# Patient Record
Sex: FEMALE | Race: WHITE | NOT HISPANIC OR LATINO | Employment: OTHER | URBAN - METROPOLITAN AREA
[De-identification: names, ages, dates, MRNs, and addresses within clinical notes are randomized per-mention and may not be internally consistent; named-entity substitution may affect disease eponyms.]

---

## 2017-10-03 ENCOUNTER — GENERIC CONVERSION - ENCOUNTER (OUTPATIENT)
Dept: OTHER | Facility: OTHER | Age: 62
End: 2017-10-03

## 2018-01-12 NOTE — RESULT NOTES
Message   Recorded as Task   Date: 08/22/2016 12:41 PM, Created By: Jeff Kent   Task Name: Follow Up   Assigned To: Mario James   Regarding Patient: Reymundo Fernandes, Status: In Progress   Comment:    Chela Suarez - 22 Aug 2016 12:41 PM     TASK CREATED  PT CALLED AND SAID THAT YOU NO LONGER NEED TO LOOK AT HER FILES  HER # [id number]  THANKS! Ari Haro - 23 Aug 2016 3:04 PM     TASK EDITED                 I have reviewed her files and will check in with her anyway  Mekhi Joshi - 24 Aug 2016 8:18 AM     TASK IN PROGRESS        Signatures   Electronically signed by :  Faiza Hitchcock, ; Aug 24 2016  8:30AM EST                       (Author)

## 2018-01-13 NOTE — MISCELLANEOUS
Message   Recorded as Task   Date: 08/22/2016 12:41 PM, Created By: Wilmar Mcginnis   Task Name: Follow Up   Assigned To: Zonia Fischer   Regarding Patient: Katie Newman, Status: In Progress   Comment:    Chela Suarez - 22 Aug 2016 12:41 PM     TASK CREATED  PT CALLED AND SAID THAT YOU NO LONGER NEED TO LOOK AT HER FILES  HER # [id number]  THANKS! Ari Haro - 23 Aug 2016 3:04 PM     TASK EDITED                 I have reviewed her files and will check in with her anyway  Zeynepfareed Bartlett - 24 Aug 2016 8:18 AM     TASK IN PROGRESS        Active Problems    1  Contusion of foot, left (924 20) (S90 32XA)   2  Encounter for long-term (current) use of medications (V58 69) (Z79 899)   3  Encounter for routine gynecological examination (V72 31) (Z01 419)   4  Encounter for routine gynecological examination (V72 31) (Z01 419)   5  Encounter for screening colonoscopy (V76 51) (Z12 11)   6  Encounter for screening mammogram for malignant neoplasm of breast (V76 12)   (Z12 31)   7  History of colon polyps (V12 72) (Z86 010)   8  Left foot pain (729 5) (M79 672)   9  Postmenopausal (V49 81) (Z78 0)   10  Post-menopausal bleeding (627 1) (N95 0)   11  Screening for HPV (human papillomavirus) (V73 81) (Z11 51)    Current Meds   1  Hydrochlorothiazide CAPS; Therapy: (Recorded:[id number]) to Recorded    Allergies    1  Penicillins  Denied    2  Hydrochlorothiazide CAPS    Signatures   Electronically signed by : Noe Nicholas LPN;  Aug 24 2016  8:31AM EST                       (Author)

## 2018-08-08 ENCOUNTER — TRANSCRIBE ORDERS (OUTPATIENT)
Dept: ADMINISTRATIVE | Facility: HOSPITAL | Age: 63
End: 2018-08-08

## 2018-08-08 DIAGNOSIS — Z86.39 PERSONAL HISTORY OF HYPERTHYROIDISM: Primary | ICD-10-CM

## 2018-08-08 DIAGNOSIS — E04.1 NONTOXIC UNINODULAR GOITER: ICD-10-CM

## 2018-08-16 ENCOUNTER — HOSPITAL ENCOUNTER (OUTPATIENT)
Dept: RADIOLOGY | Facility: MEDICAL CENTER | Age: 63
Discharge: HOME | End: 2018-08-16
Payer: COMMERCIAL

## 2018-08-16 DIAGNOSIS — Z86.39 PERSONAL HISTORY OF HYPERTHYROIDISM: ICD-10-CM

## 2018-08-16 PROCEDURE — 76536 US EXAM OF HEAD AND NECK: CPT

## 2018-08-22 ENCOUNTER — OFFICE VISIT (OUTPATIENT)
Dept: OBGYN CLINIC | Facility: CLINIC | Age: 63
End: 2018-08-22
Payer: COMMERCIAL

## 2018-08-22 VITALS — SYSTOLIC BLOOD PRESSURE: 108 MMHG | WEIGHT: 148 LBS | DIASTOLIC BLOOD PRESSURE: 58 MMHG | BODY MASS INDEX: 27.07 KG/M2

## 2018-08-22 DIAGNOSIS — N89.8 VAGINAL ODOR: Primary | ICD-10-CM

## 2018-08-22 PROCEDURE — 99213 OFFICE O/P EST LOW 20 MIN: CPT | Performed by: PHYSICIAN ASSISTANT

## 2018-08-22 RX ORDER — TINIDAZOLE 500 MG/1
TABLET ORAL
Qty: 10 TABLET | Refills: 0 | Status: SHIPPED | OUTPATIENT
Start: 2018-08-22 | End: 2018-08-27

## 2018-08-22 RX ORDER — TRIAMTERENE AND HYDROCHLOROTHIAZIDE 37.5; 25 MG/1; MG/1
CAPSULE ORAL
COMMUNITY
Start: 2018-06-09

## 2018-08-22 NOTE — PROGRESS NOTES
Lindie Kocher  1955    S:  61 y o  female here for a problem visit  She reports a nine day history of vulvar burning  She recently did switch to an antibacterial soap  She had one day of some yellow discharge but nothing since  She uses bioidentical hormones  She has not been sexually active in years due to her vaginal atrophy  She says that the bioidentical hormones have improved her vaginal atrophy  Past Medical History:   Diagnosis Date    Graves disease     Hyperthyroidism     Skin cancer      Family History   Problem Relation Age of Onset    Cancer Mother     Alzheimer's disease Father     Hypertension Father     Skin cancer Father     Skin cancer Brother     Cancer Paternal Grandmother     Skin cancer Paternal Aunt      Social History     Social History    Marital status: /Civil Union     Spouse name: N/A    Number of children: N/A    Years of education: N/A     Social History Main Topics    Smoking status: Never Smoker    Smokeless tobacco: Never Used    Alcohol use No    Drug use: No    Sexual activity: No     Other Topics Concern    None     Social History Narrative    None       O:  /58   Wt 67 1 kg (148 lb)   BMI 27 07 kg/m²   She appears well and is in no distress  Abdomen is soft and nontender  External genitals are normal without lesions or rashes  Vagina is severely atrophic, introitus is narrow, no discharge seen, fishy odor appreciated    A/P:  Vulvar burning  Tindamax 500mg two po daily x 5 days  If not better, would more consider that her bioidenticals are not covering her for atrophy as well as they could be, may want to consider vaginal estrogen  Can use coconut oil topically  Avoid soap to the area

## 2018-08-30 ENCOUNTER — HOSPITAL ENCOUNTER (OUTPATIENT)
Dept: RADIOLOGY | Facility: HOSPITAL | Age: 63
Discharge: HOME | End: 2018-08-30
Payer: COMMERCIAL

## 2018-08-30 DIAGNOSIS — E04.1 NONTOXIC UNINODULAR GOITER: ICD-10-CM

## 2018-08-30 PROCEDURE — 88172 CYTP DX EVAL FNA 1ST EA SITE: CPT | Performed by: PATHOLOGY

## 2018-08-30 PROCEDURE — 76942 ECHO GUIDE FOR BIOPSY: CPT

## 2018-08-30 PROCEDURE — 10022 HB FNA W/IMAGE: CPT

## 2018-08-30 PROCEDURE — 88173 CYTOPATH EVAL FNA REPORT: CPT | Performed by: PATHOLOGY

## 2018-08-30 RX ORDER — LIDOCAINE HYDROCHLORIDE 10 MG/ML
2 INJECTION, SOLUTION INFILTRATION; PERINEURAL ONCE
Status: COMPLETED | OUTPATIENT
Start: 2018-08-30 | End: 2018-08-30

## 2018-08-30 RX ADMIN — LIDOCAINE HYDROCHLORIDE 2 ML: 10 INJECTION, SOLUTION INFILTRATION; PERINEURAL at 10:44

## 2018-11-02 ENCOUNTER — OFFICE VISIT (OUTPATIENT)
Dept: OBGYN CLINIC | Facility: CLINIC | Age: 63
End: 2018-11-02
Payer: COMMERCIAL

## 2018-11-02 VITALS
WEIGHT: 144 LBS | SYSTOLIC BLOOD PRESSURE: 122 MMHG | BODY MASS INDEX: 26.5 KG/M2 | HEIGHT: 62 IN | DIASTOLIC BLOOD PRESSURE: 82 MMHG

## 2018-11-02 DIAGNOSIS — N95.0 POSTMENOPAUSAL BLEEDING: Primary | ICD-10-CM

## 2018-11-02 DIAGNOSIS — N95.2 VAGINAL ATROPHY: ICD-10-CM

## 2018-11-02 PROCEDURE — 99213 OFFICE O/P EST LOW 20 MIN: CPT | Performed by: NURSE PRACTITIONER

## 2018-11-02 RX ORDER — ESTRADIOL 0.1 MG/G
CREAM VAGINAL
Qty: 42.5 G | Refills: 0 | Status: SHIPPED | OUTPATIENT
Start: 2018-11-02 | End: 2019-12-04 | Stop reason: SDUPTHER

## 2018-11-06 ENCOUNTER — TELEPHONE (OUTPATIENT)
Dept: OBGYN CLINIC | Facility: CLINIC | Age: 63
End: 2018-11-06

## 2018-11-06 NOTE — TELEPHONE ENCOUNTER
Patient stated she has the lab results for her Thyroid  Wanted the fax number to send them to us  Provided the pt with it  She will fax it today

## 2018-11-08 PROBLEM — N95.2 VAGINAL ATROPHY: Status: ACTIVE | Noted: 2018-11-08

## 2018-11-08 PROBLEM — N95.0 POSTMENOPAUSAL BLEEDING: Status: ACTIVE | Noted: 2018-11-08

## 2018-11-08 NOTE — PROGRESS NOTES
Assessment/Plan:    Vaginal atrophy  Suspect this is etiology of spotting  Recommended estrace to affected area  Reviewed benefits of daily lubricant/skin barrier  Will check pelvic US to rule out abn findings within the endometrium and will advise as indicated  Pt agree with plan       Diagnoses and all orders for this visit:    Postmenopausal bleeding  -     US pelvis transabdominal only; Future    Vaginal atrophy  -     estradiol (ESTRACE) 0 1 mg/g vaginal cream; Apply pea sized amount to affected area nightly for one week          Subjective:      Patient ID: Maria Dolores Lipjerzy is a 61 y o  female  This patient presents with c/o spotting   This was noted about 1 week ago  Noted with wiping   This is dark red/brownish  She denies pelvic pain, abn discharge, odor   Denies prior h/o  bleeding   + h/o thyroid dysfunction - euthyroid per pt   Uses bioid hormones but no topicals         The following portions of the patient's history were reviewed and updated as appropriate: allergies, current medications, past family history, past medical history, past social history, past surgical history and problem list     Review of Systems   Constitutional: Negative  HENT: Negative  Eyes: Negative  Respiratory: Negative  Cardiovascular: Negative  Gastrointestinal: Negative  Endocrine: Negative  Genitourinary: Positive for vaginal bleeding  Negative for dysuria, pelvic pain and vaginal discharge  Musculoskeletal: Negative  Skin: Negative  Allergic/Immunologic: Negative  Neurological: Negative  Hematological: Negative  Psychiatric/Behavioral: Negative  Objective:      /82 (BP Location: Right arm, Patient Position: Sitting)   Ht 5' 2" (1 575 m)   Wt 65 3 kg (144 lb)   BMI 26 34 kg/m²          Physical Exam   Constitutional: She is oriented to person, place, and time  She appears well-developed and well-nourished  HENT:   Head: Normocephalic and atraumatic     Eyes: Pupils are equal, round, and reactive to light  Neck: Normal range of motion  Pulmonary/Chest: Effort normal    Abdominal: Hernia confirmed negative in the right inguinal area and confirmed negative in the left inguinal area  Genitourinary: Rectum normal and uterus normal        There is no rash, tenderness, lesion or injury on the right labia  There is no rash, tenderness, lesion or injury on the left labia  Cervix exhibits no motion tenderness, no discharge and no friability  Right adnexum displays no mass, no tenderness and no fullness  Left adnexum displays no mass, no tenderness and no fullness  No erythema, tenderness or bleeding in the vagina  No vaginal discharge found  Musculoskeletal: Normal range of motion  Lymphadenopathy:        Right: No inguinal adenopathy present  Left: No inguinal adenopathy present  Neurological: She is alert and oriented to person, place, and time  Skin: Skin is warm and dry  Psychiatric: She has a normal mood and affect   Her behavior is normal  Judgment and thought content normal

## 2018-11-08 NOTE — ASSESSMENT & PLAN NOTE
Suspect this is etiology of spotting  Recommended estrace to affected area  Reviewed benefits of daily lubricant/skin barrier  Will check pelvic US to rule out abn findings within the endometrium and will advise as indicated   Pt agree with plan

## 2018-11-23 ENCOUNTER — HOSPITAL ENCOUNTER (OUTPATIENT)
Dept: RADIOLOGY | Facility: MEDICAL CENTER | Age: 63
Discharge: HOME | End: 2018-11-23
Payer: COMMERCIAL

## 2018-11-23 DIAGNOSIS — N95.0 POSTMENOPAUSAL BLEEDING: ICD-10-CM

## 2018-11-23 PROCEDURE — 76856 US EXAM PELVIC COMPLETE: CPT

## 2018-11-30 ENCOUNTER — TELEPHONE (OUTPATIENT)
Dept: OBGYN CLINIC | Facility: MEDICAL CENTER | Age: 63
End: 2018-11-30

## 2018-11-30 NOTE — TELEPHONE ENCOUNTER
Pt called looking for her pelvic u/s results  Results are finalized if you can just review and let us know your recommendations  Messi Pinto also wanted to let you know that she is still experiencing spotting with the estrace and is wondering if there's anything else she can try

## 2018-12-03 NOTE — TELEPHONE ENCOUNTER
----- Message from [place], [address] sent at 11/30/2018  4:15 PM EST -----  Pelvic US with normal findings   This was ordered to r/o endometrial abnormalities   She presented with  spotting and exam was with external atrophic changes, which are presumed to be the cause   2mm endo stripe with no free fluid further support this   Please advise that estrace may take time to improve the skin integrity of the affected area   Encourage her to continue using this as well as the skin barrier we discussed at office visit   If no improvement in 4-6 weeks she should return to the office for another exam

## 2018-12-03 NOTE — TELEPHONE ENCOUNTER
Patient is aware of pelvic u /s results  She will continuing using the estrace and skin barrier that was discussed at her ov

## 2019-03-20 ENCOUNTER — TRANSCRIBE ORDERS (OUTPATIENT)
Dept: ADMINISTRATIVE | Facility: HOSPITAL | Age: 64
End: 2019-03-20

## 2019-03-20 DIAGNOSIS — J01.91 ACUTE RECURRENT SINUSITIS, UNSPECIFIED LOCATION: Primary | ICD-10-CM

## 2019-04-01 ENCOUNTER — HOSPITAL ENCOUNTER (OUTPATIENT)
Dept: RADIOLOGY | Facility: MEDICAL CENTER | Age: 64
Discharge: HOME | End: 2019-04-01
Payer: COMMERCIAL

## 2019-04-01 DIAGNOSIS — J01.91 ACUTE RECURRENT SINUSITIS, UNSPECIFIED LOCATION: ICD-10-CM

## 2019-04-01 PROCEDURE — 70486 CT MAXILLOFACIAL W/O DYE: CPT

## 2019-08-28 ENCOUNTER — OFFICE VISIT (OUTPATIENT)
Dept: ENDOCRINOLOGY | Facility: CLINIC | Age: 64
End: 2019-08-28
Payer: COMMERCIAL

## 2019-08-28 VITALS
WEIGHT: 150.6 LBS | SYSTOLIC BLOOD PRESSURE: 128 MMHG | BODY MASS INDEX: 26.68 KG/M2 | HEART RATE: 86 BPM | HEIGHT: 63 IN | DIASTOLIC BLOOD PRESSURE: 78 MMHG

## 2019-08-28 DIAGNOSIS — Z86.39 H/O HYPERTHYROIDISM: Primary | ICD-10-CM

## 2019-08-28 DIAGNOSIS — E06.3 HASHIMOTO'S THYROIDITIS: ICD-10-CM

## 2019-08-28 DIAGNOSIS — E55.9 VITAMIN D DEFICIENCY: ICD-10-CM

## 2019-08-28 PROCEDURE — 99244 OFF/OP CNSLTJ NEW/EST MOD 40: CPT | Performed by: INTERNAL MEDICINE

## 2019-08-28 NOTE — PROGRESS NOTES
New Patient Progress Note      Referring Provider  Adan Marcial Do  [birth year]  [facility], [address]     History of Present Illness:     Huyen Jaquez is 70-year-old woman with past medical history of post menopause, Graves disease is here for evaluation and management of Graves disease and Hashimoto thyroiditis    History was obtained from patient as well as from review of records    She gives history of Graves disease, 20 years ago, at that time she was treated with medical management for some time, which was resolved, and she was off the medication for almost 20 years  When she started to notice symptoms of hyperthyroidism, again in 2018, which showed suppressed TSH with elevated free T3  Previous workup showed that she has anti peroxidase antibodies positive  She went on a special diet, such as avoiding processed food, more plant based diet  Her most recent TSH was normal 1 01, free T4 1 3 and free T3-3 3  Currently she denies symptoms of palpitations, weight loss, anxiety or nervousness, shakiness  She denies feeling being tired and fatigue, cold intolerance, weight gain    PT has thyroid nodule- which was biopsied in 2018 and cytology result was benign, benign follicular cells in micro follicular formation and flat she eats and colitis compatible with benign follicular nodule  No ultrasound for review      No History of external radiation to head/neck/chest   No recent Iodine loading in form of medication, erbs or kelp supplements or radiological diagnostic studies      Currently taking vitamin-D supplementation 5000 International Units daily    Patient Active Problem List   Diagnosis    Postmenopausal bleeding    Vaginal atrophy     Past Medical History:   Diagnosis Date    Graves disease     Hyperthyroidism     Skin cancer       Past Surgical History:   Procedure Laterality Date    TONSILLECTOMY      US GUIDED THYROID BIOPSY  8/30/2018      Family History   Problem Relation Age of Onset  Cancer Mother     Alzheimer's disease Father     Hypertension Father     Skin cancer Father     Skin cancer Brother     Cancer Paternal Grandmother     Skin cancer Paternal Aunt      Social History     Tobacco Use    Smoking status: Never Smoker    Smokeless tobacco: Never Used   Substance Use Topics    Alcohol use: No     Allergies   Allergen Reactions    Levofloxacin     Penicillins      [unfilled]    Review of Systems   Constitutional: Negative for activity change, diaphoresis, fatigue, fever and unexpected weight change  HENT: Negative  Eyes: Negative for visual disturbance  Respiratory: Negative for cough, chest tightness and shortness of breath  Cardiovascular: Negative for chest pain, palpitations and leg swelling  Gastrointestinal: Negative for abdominal pain, blood in stool, constipation, diarrhea, nausea and vomiting  Endocrine: Negative for cold intolerance, heat intolerance, polydipsia, polyphagia and polyuria  Genitourinary: Negative for dysuria, enuresis, frequency and urgency  Musculoskeletal: Negative for arthralgias and myalgias  Skin: Negative for pallor, rash and wound  Allergic/Immunologic: Negative  Neurological: Negative for dizziness, tremors, weakness and numbness  Hematological: Negative  Psychiatric/Behavioral: Negative  Physical Exam:  Body mass index is 26 68 kg/m²  /78   Pulse 86   Ht 5' 3" (1 6 m)   Wt 68 3 kg (150 lb 9 6 oz)   BMI 26 68 kg/m²    [unfilled]     Physical Exam   Constitutional: She is oriented to person, place, and time  She appears well-developed and well-nourished  No distress  HENT:   Head: Normocephalic and atraumatic  Eyes: Conjunctivae and EOM are normal  Right eye exhibits no discharge  Left eye exhibits no discharge  Neck: Normal range of motion  Neck supple  No thyromegaly present  Cardiovascular: Normal rate, regular rhythm and normal heart sounds  No murmur heard    Pulmonary/Chest: Effort normal and breath sounds normal  No respiratory distress  She has no wheezes  Abdominal: Soft  Bowel sounds are normal  She exhibits no distension  There is no tenderness  Musculoskeletal: Normal range of motion  She exhibits no edema, tenderness or deformity  Neurological: She is alert and oriented to person, place, and time  She has normal reflexes  She displays normal reflexes  Skin: Skin is warm and dry  No rash noted  She is not diaphoretic  No erythema  Psychiatric: She has a normal mood and affect  Her behavior is normal    Vitals reviewed  Diabetic Foot Exam    Labs:   No components found for: HA1C  No components found for: GLU    No results found for: CREATININE, BUN, NA, K, CL, CO2  No results found for: EGFR  No components found for: MALBCRER    No results found for: CHOL, HDL, TRIG, CHOLHDL    No results found for: ALT, AST, GGT, ALKPHOS, BILITOT    No results found for: TSH, FREET4, TSI    Impression:  1  H/O hyperthyroidism    2  Vitamin D deficiency    3  Hashimoto's thyroiditis         Plan:    Diagnoses and all orders for this visit:    H/O hyperthyroidism  We did not know the etiology of hyperthyroidism  I have ordered thyroid stimulating immunoglobulin to rule out Graves disease  Will repeat TSH free T4 and free T3, as well as thyroid antibody panel  If her thyroid blood work is within normal limits, will follow-up in 6 months  If thyroid blood work is abnormal will have to make appointment sooner, to discuss the next steps    -     T4, free; Future  -     TSH, 3rd generation; Future  -     US thyroid; Future  -     Thyroid Antibodies Panel; Future  -     Thyroid stimulating immunoglobulin; Future  -     Vitamin D 25 hydroxy; Future  -     T4, free; Future  -     TSH, 3rd generation; Future  -     T3, free;  Future    Vitamin D deficiency  Currently patient is taking vitamin D3 supplementation 5000 International Units daily  Will repeat vitamin-D now, and make adjustment if necessary  -     Vitamin D 25 hydroxy; Future    Hashimoto's thyroiditis  TPO antibodies positive previously suggestive of Hashimoto thyroiditis  Discussed the risk of hypothyroidism in future  Educated patient about symptoms of hypothyroidism and call if she notices any  -     T4, free; Future  -     TSH, 3rd generation; Future  -     T3, free; Future      There are no diagnoses linked to this encounter  Discussed with the patient and all questioned fully answered  She will call me if any problems arise      Counseled patient on diagnostic results, prognosis, risk and benefit of treatment options, instruction for management, importance of treatment compliance, Risk  factor reduction and impressions      Gianna Daniel MD

## 2019-10-22 ENCOUNTER — TELEPHONE (OUTPATIENT)
Dept: ENDOCRINOLOGY | Facility: CLINIC | Age: 64
End: 2019-10-22

## 2019-10-22 NOTE — TELEPHONE ENCOUNTER
Thyroglobulin antibody as well as TSI , thyroid stimulating immunoglobulin thanks     Cristina Nevarez MD

## 2019-10-22 NOTE — TELEPHONE ENCOUNTER
Barb Arteaga from "[employer] called  Questioning the thyroid antibody panel  What exactly do you want done? Blood already drawn today    Please call gio back at [phone] and leave message if they dont  phone

## 2019-10-24 ENCOUNTER — HOSPITAL ENCOUNTER (OUTPATIENT)
Dept: RADIOLOGY | Facility: MEDICAL CENTER | Age: 64
Discharge: HOME | End: 2019-10-24
Payer: COMMERCIAL

## 2019-10-24 DIAGNOSIS — Z86.39 H/O HYPERTHYROIDISM: ICD-10-CM

## 2019-10-24 LAB
25(OH)D3 SERPL-MCNC: 37 NG/ML (ref 30–100)
T4 FREE SERPL-MCNC: 1.3 NG/DL (ref 0.8–1.8)
THYROGLOB AB SERPL-ACNC: <1 IU/ML
TSH SERPL-ACNC: 1.17 MIU/L (ref 0.4–4.5)
TSI SER-ACNC: 243 % BASELINE

## 2019-10-24 PROCEDURE — 76536 US EXAM OF HEAD AND NECK: CPT

## 2019-10-29 ENCOUNTER — TELEPHONE (OUTPATIENT)
Dept: ENDOCRINOLOGY | Facility: CLINIC | Age: 64
End: 2019-10-29

## 2019-10-29 NOTE — TELEPHONE ENCOUNTER
----- Message from Taisha Lagunas MD sent at 10/29/2019 12:58 PM EDT -----  Please call the patient regarding her thyroid blood work results, her thyroid hormone profile is within normal limits, no need to start any treatment, will continue to monitor and follow-up in March    She does have positive thyroid stimulating antibodies, that is why she needs to be monitored every 6 months for thyroid

## 2019-10-30 ENCOUNTER — TELEPHONE (OUTPATIENT)
Dept: ENDOCRINOLOGY | Facility: CLINIC | Age: 64
End: 2019-10-30

## 2019-10-30 NOTE — TELEPHONE ENCOUNTER
----- Message from Jennifer Jay MD sent at 10/29/2019 12:58 PM EDT -----  Please call the patient regarding her thyroid blood work results, her thyroid hormone profile is within normal limits, no need to start any treatment, will continue to monitor and follow-up in March    She does have positive thyroid stimulating antibodies, that is why she needs to be monitored every 6 months for thyroid

## 2019-10-30 NOTE — TELEPHONE ENCOUNTER
----- Message from Dorina Cox MD sent at 10/29/2019 12:58 PM EDT -----  Please call the patient regarding her thyroid blood work results, her thyroid hormone profile is within normal limits, no need to start any treatment, will continue to monitor and follow-up in March    She does have positive thyroid stimulating antibodies, that is why she needs to be monitored every 6 months for thyroid

## 2019-10-31 ENCOUNTER — TELEPHONE (OUTPATIENT)
Dept: ENDOCRINOLOGY | Facility: CLINIC | Age: 64
End: 2019-10-31

## 2019-10-31 NOTE — TELEPHONE ENCOUNTER
----- Message from Hector Acosta MD sent at 10/31/2019 10:26 AM EDT -----  Please call the patient regarding her thyroid ultrasound results, right thyroid nodule shows no significant change in size, stable findings, she will need thyroid ultrasound in 1-2 year for follow-up

## 2019-12-04 ENCOUNTER — ANNUAL EXAM (OUTPATIENT)
Dept: OBGYN CLINIC | Facility: MEDICAL CENTER | Age: 64
End: 2019-12-04
Payer: COMMERCIAL

## 2019-12-04 VITALS — WEIGHT: 153 LBS | BODY MASS INDEX: 27.1 KG/M2 | DIASTOLIC BLOOD PRESSURE: 84 MMHG | SYSTOLIC BLOOD PRESSURE: 118 MMHG

## 2019-12-04 DIAGNOSIS — Z79.890 POST-MENOPAUSE ON HRT (HORMONE REPLACEMENT THERAPY): ICD-10-CM

## 2019-12-04 DIAGNOSIS — N95.2 VAGINAL ATROPHY: ICD-10-CM

## 2019-12-04 DIAGNOSIS — Z12.4 ENCOUNTER FOR PAPANICOLAOU SMEAR FOR CERVICAL CANCER SCREENING: ICD-10-CM

## 2019-12-04 DIAGNOSIS — Z01.419 ENCOUNTER FOR GYNECOLOGICAL EXAMINATION (GENERAL) (ROUTINE) WITHOUT ABNORMAL FINDINGS: Primary | ICD-10-CM

## 2019-12-04 DIAGNOSIS — Z11.51 SCREENING FOR HPV (HUMAN PAPILLOMAVIRUS): ICD-10-CM

## 2019-12-04 DIAGNOSIS — Z12.31 ENCOUNTER FOR SCREENING MAMMOGRAM FOR MALIGNANT NEOPLASM OF BREAST: ICD-10-CM

## 2019-12-04 PROBLEM — N95.0 POSTMENOPAUSAL BLEEDING: Status: RESOLVED | Noted: 2018-11-08 | Resolved: 2019-12-04

## 2019-12-04 PROCEDURE — S0612 ANNUAL GYNECOLOGICAL EXAMINA: HCPCS | Performed by: NURSE PRACTITIONER

## 2019-12-04 PROCEDURE — 87624 HPV HI-RISK TYP POOLED RSLT: CPT | Performed by: NURSE PRACTITIONER

## 2019-12-04 PROCEDURE — G0145 SCR C/V CYTO,THINLAYER,RESCR: HCPCS | Performed by: NURSE PRACTITIONER

## 2019-12-04 RX ORDER — ESTRADIOL 0.1 MG/G
CREAM VAGINAL
Qty: 42.5 G | Refills: 4 | Status: SHIPPED | OUTPATIENT
Start: 2019-12-04

## 2019-12-04 NOTE — ASSESSMENT & PLAN NOTE
Driss Mora continues use of bio-identical hormones, which are being managed by Dr Chiki Parks through a "wellness plan"  Reviewed risks and potential benefits at length  Discussed lack of FDA approval and potential safety concerns  Advised that use of bio-id hormones will not likely benefit vaginal mucosa and that use of topical estrogen to areas of concern is advised  She verbalizes understanding, feels this regimen is highly effective for her and desires to continue

## 2019-12-04 NOTE — ASSESSMENT & PLAN NOTE
Exam with healing vulvar fissure in area of concern  Recommended use of topical estrace 2 times weekly at bedtime and daily use of vaseline or aquaphor as skin barrier q day  Encouraged self exam with mirror periodically and reviewed sx to report

## 2019-12-04 NOTE — ASSESSMENT & PLAN NOTE
Benign findings on routine gyn exam  Recommended monthly SBE, annual CBE and annual screening mammo  ASCCP guidelines reviewed and pap with cotesting was collected today; this low risk patient was advised she meets criteria to d/c pap screening at age 72  DEXA and colonoscopy up to date per patient  The patient denies STI risk factors and declines testing at this time  Reviewed diet/activity recommendations:  Encouraged daily Ca++ and vitamin D intake as well as daily weight bearing exercise for promotion of bone health    Discussed postmenopausal considerations and symptoms to report  RTO in one year for routine annual gyn exam or sooner PRN

## 2019-12-04 NOTE — PROGRESS NOTES
Assessment/Plan:    Post-menopause on HRT (hormone replacement therapy)  Daylin Bush continues use of bio-identical hormones, which are being managed by Dr Claudia Khan through a "wellness plan"  Reviewed risks and potential benefits at length  Discussed lack of FDA approval and potential safety concerns  Advised that use of bio-id hormones will not likely benefit vaginal mucosa and that use of topical estrogen to areas of concern is advised  She verbalizes understanding, feels this regimen is highly effective for her and desires to continue  Vaginal atrophy  Exam with healing vulvar fissure in area of concern  Recommended use of topical estrace 2 times weekly at bedtime and daily use of vaseline or aquaphor as skin barrier q day  Encouraged self exam with mirror periodically and reviewed sx to report  Encounter for gynecological examination (general) (routine) without abnormal findings  Benign findings on routine gyn exam  Recommended monthly SBE, annual CBE and annual screening mammo  ASCCP guidelines reviewed and pap with cotesting was collected today; this low risk patient was advised she meets criteria to d/c pap screening at age 72  DEXA and colonoscopy up to date per patient  The patient denies STI risk factors and declines testing at this time  Reviewed diet/activity recommendations:  Encouraged daily Ca++ and vitamin D intake as well as daily weight bearing exercise for promotion of bone health    Discussed postmenopausal considerations and symptoms to report  RTO in one year for routine annual gyn exam or sooner PRN  Diagnoses and all orders for this visit:    Encounter for gynecological examination (general) (routine) without abnormal findings    Encounter for screening mammogram for malignant neoplasm of breast  -     Mammo screening bilateral w cad;  Future    Encounter for Papanicolaou smear for cervical cancer screening  -     Liquid-based pap, screening    Screening for HPV (human papillomavirus)  -     Liquid-based pap, screening    Vaginal atrophy  -     estradiol (ESTRACE) 0 1 mg/g vaginal cream; Apply pea sized amount to affected area two times per week at bedtime    Post-menopause on HRT (hormone replacement therapy)          Subjective:      Patient ID: Latha Ochoa is a 59 y o  female  This patient presents for routine annual gyn exam   Medically stable  Last seen with spotting 2ndary to atrophic changes and fissure at introitus  This improved after starting estrace but she did experience discomfort after she d/c'd the med again  She is not using daily lubricant   Taking bio-identical hormones for >10 yrs  She reports estrogen/progesterone compound is obtained at [facility]  Rx'd by Dr Anahi Mckinney who has recommended that she continue this regimen for life  She reports feeling more energetic  She denies acute gyn complaints  She denies  bleeding or spotting, VM sx, pelvic pain, breast concerns, abn discharge, bowel/bladder dysfunction, depression/anx   and monog  Denies STI concerns  The following portions of the patient's history were reviewed and updated as appropriate: allergies, current medications, past family history, past medical history, past social history, past surgical history and problem list     Review of Systems   Constitutional: Negative  Respiratory: Negative  Cardiovascular: Negative  Gastrointestinal: Negative  Genitourinary: Negative  Irritation at introitus    Musculoskeletal: Negative  Skin: Negative  Neurological: Negative  Psychiatric/Behavioral: Negative  Objective:      /84   Wt 69 4 kg (153 lb)   BMI 27 10 kg/m²          Physical Exam   Constitutional: She is oriented to person, place, and time  She appears well-developed and well-nourished  HENT:   Head: Normocephalic and atraumatic  Eyes: Pupils are equal, round, and reactive to light   EOM are normal    Neck: Normal range of motion  Neck supple  No thyromegaly present  Cardiovascular: Normal rate, regular rhythm and normal heart sounds  Pulmonary/Chest: Effort normal and breath sounds normal  No respiratory distress  She has no wheezes  She has no rales  She exhibits no mass, no tenderness and no deformity  Right breast exhibits no inverted nipple, no mass, no nipple discharge, no skin change and no tenderness  Left breast exhibits no inverted nipple, no mass, no nipple discharge, no skin change and no tenderness  No breast tenderness or discharge  Breasts are symmetrical    Abdominal: Soft  She exhibits no distension and no mass  There is no splenomegaly or hepatomegaly  There is no tenderness  There is no rebound and no guarding  Genitourinary: Rectum normal, vagina normal and uterus normal        No breast tenderness or discharge  No labial fusion  There is no rash, tenderness, lesion or injury on the right labia  There is no rash, tenderness, lesion or injury on the left labia  Cervix exhibits friability  Cervix exhibits no motion tenderness and no discharge  Right adnexum displays no mass, no tenderness and no fullness  Left adnexum displays no mass, no tenderness and no fullness  No erythema, tenderness or bleeding in the vagina  No foreign body in the vagina  No vaginal discharge found  Musculoskeletal: Normal range of motion  Lymphadenopathy:     She has no cervical adenopathy  She has no axillary adenopathy  Neurological: She is alert and oriented to person, place, and time  No cranial nerve deficit  Skin: Skin is warm and dry  No rash noted  No cyanosis  Nails show no clubbing  Psychiatric: She has a normal mood and affect   Her speech is normal and behavior is normal  Judgment and thought content normal  Cognition and memory are normal

## 2019-12-05 LAB
HPV HR 12 DNA CVX QL NAA+PROBE: NEGATIVE
HPV16 DNA CVX QL NAA+PROBE: NEGATIVE
HPV18 DNA CVX QL NAA+PROBE: NEGATIVE

## 2019-12-06 LAB
LAB AP GYN PRIMARY INTERPRETATION: NORMAL
Lab: NORMAL

## 2019-12-09 ENCOUNTER — TELEPHONE (OUTPATIENT)
Dept: OBGYN CLINIC | Facility: CLINIC | Age: 64
End: 2019-12-09

## 2019-12-09 NOTE — TELEPHONE ENCOUNTER
----- Message from Gregory Stephen, [address] sent at 12/9/2019  9:49 AM EST -----  Normal pap and neg HPV   Please notify patient

## 2019-12-09 NOTE — TELEPHONE ENCOUNTER
Contacted pt # [phone]-recv vm- per hippa communication consent on file- lmom advising as directed

## 2020-02-12 ENCOUNTER — TELEPHONE (OUTPATIENT)
Dept: ENDOCRINOLOGY | Facility: CLINIC | Age: 65
End: 2020-02-12

## 2020-02-12 NOTE — TELEPHONE ENCOUNTER
Pt renu requesting la slip be emailed to her for upcoming appointment  Emailed as requested to [email or web address]  [email or web address]

## 2020-02-25 ENCOUNTER — TELEPHONE (OUTPATIENT)
Dept: ENDOCRINOLOGY | Facility: CLINIC | Age: 65
End: 2020-02-25

## 2020-02-25 DIAGNOSIS — E06.3 HASHIMOTO'S THYROIDITIS: ICD-10-CM

## 2020-02-25 DIAGNOSIS — Z86.39 H/O HYPERTHYROIDISM: Primary | ICD-10-CM

## 2020-02-25 LAB
T3FREE SERPL-MCNC: 2.8 PG/ML (ref 2.3–4.2)
T4 FREE SERPL-MCNC: 1.5 NG/DL (ref 0.8–1.8)
TSH SERPL-ACNC: 1.76 MIU/L (ref 0.4–4.5)

## 2020-02-25 NOTE — TELEPHONE ENCOUNTER
----- Message from Alexis Kim MD sent at 2/25/2020  1:03 PM EST -----  Please inform patient that thyroid blood work is normal, she will need follow-up appointment in 6 months with blood work prior

## 2020-02-25 NOTE — RESULT ENCOUNTER NOTE
Please inform patient that thyroid blood work is normal, she will need follow-up appointment in 6 months with blood work prior

## 2020-02-26 ENCOUNTER — OFFICE VISIT (OUTPATIENT)
Dept: OBGYN CLINIC | Facility: MEDICAL CENTER | Age: 65
End: 2020-02-26
Payer: COMMERCIAL

## 2020-02-26 VITALS — SYSTOLIC BLOOD PRESSURE: 122 MMHG | DIASTOLIC BLOOD PRESSURE: 70 MMHG | BODY MASS INDEX: 27.81 KG/M2 | WEIGHT: 157 LBS

## 2020-02-26 DIAGNOSIS — N90.7 INCLUSION CYST OF VULVA: Primary | ICD-10-CM

## 2020-02-26 PROCEDURE — 99213 OFFICE O/P EST LOW 20 MIN: CPT | Performed by: NURSE PRACTITIONER

## 2020-02-26 NOTE — PROGRESS NOTES
Assessment/Plan:    Inclusion cyst of vulva  Vulvar inclusion cyst present in area of concern  Reviewed natural hx and benign nature of this  Expectant management recommended  All questions were answered to the patient's satisfaction       Diagnoses and all orders for this visit:    Inclusion cyst of vulva          Subjective:      Patient ID: Kassidy Patterson is a 59 y o  female  This patient presents with c/o nontender lump on left posterior vulva  Noted on self exam this week  This is not painful  Denies bleeding, drainage, pruritis, rash         The following portions of the patient's history were reviewed and updated as appropriate: allergies, current medications, past family history, past medical history, past social history, past surgical history and problem list     Review of Systems   Constitutional: Negative  Respiratory: Negative  Cardiovascular: Negative  Gastrointestinal: Negative  Genitourinary: Negative  Lump on left vulva   Musculoskeletal: Negative  Skin: Negative  Neurological: Negative  Psychiatric/Behavioral: Negative  Objective:      /70   Wt 71 2 kg (157 lb)   BMI 27 81 kg/m²          Physical Exam   Constitutional: She is oriented to person, place, and time  She appears well-developed and well-nourished  HENT:   Head: Normocephalic and atraumatic  Eyes: Pupils are equal, round, and reactive to light  Neck: Normal range of motion  Pulmonary/Chest: Effort normal    Abdominal: Hernia confirmed negative in the right inguinal area and confirmed negative in the left inguinal area  Genitourinary: Rectum normal        There is no rash, tenderness, lesion or injury on the right labia  There is lesion on the left labia  There is no rash, tenderness or injury on the left labia  Musculoskeletal: Normal range of motion  Lymphadenopathy:        Right: No inguinal adenopathy present  Left: No inguinal adenopathy present     Neurological: She is alert and oriented to person, place, and time  Skin: Skin is warm and dry  Psychiatric: She has a normal mood and affect   Her behavior is normal  Judgment and thought content normal

## 2020-03-02 NOTE — TELEPHONE ENCOUNTER
Pt called back and understood results, scheduled an appt for 9-9-2020 with Juanis Shelley  Can you order labs for patient prior to appt? Mail once ordered

## 2020-08-20 ENCOUNTER — TELEPHONE (OUTPATIENT)
Dept: ENDOCRINOLOGY | Facility: CLINIC | Age: 65
End: 2020-08-20

## 2020-08-22 LAB
T3 SERPL-MCNC: 86 NG/DL (ref 76–181)
T4 FREE SERPL-MCNC: 1.4 NG/DL (ref 0.8–1.8)
TSH SERPL-ACNC: 1.27 MIU/L (ref 0.4–4.5)

## 2020-08-31 ENCOUNTER — TELEPHONE (OUTPATIENT)
Dept: ENDOCRINOLOGY | Facility: CLINIC | Age: 65
End: 2020-08-31

## 2020-09-09 ENCOUNTER — TELEMEDICINE (OUTPATIENT)
Dept: ENDOCRINOLOGY | Facility: CLINIC | Age: 65
End: 2020-09-09
Payer: MEDICARE

## 2020-09-09 DIAGNOSIS — Z86.39 H/O HYPERTHYROIDISM: Primary | ICD-10-CM

## 2020-09-09 DIAGNOSIS — E04.1 THYROID NODULE: ICD-10-CM

## 2020-09-09 PROCEDURE — 99213 OFFICE O/P EST LOW 20 MIN: CPT | Performed by: PHYSICIAN ASSISTANT

## 2020-09-09 NOTE — PROGRESS NOTES
Virtual Regular Visit      Assessment/Plan:    Problem List Items Addressed This Visit     None               Reason for visit is hyperthyroidism  Chief Complaint   Patient presents with    Virtual Regular Visit        Encounter provider Mehrdad Maldonado PA-C    Provider located at [address] [place] [id number]      Recent Visits  No visits were found meeting these conditions  Showing recent visits within past 7 days and meeting all other requirements     Future Appointments  No visits were found meeting these conditions  Showing future appointments within next 150 days and meeting all other requirements        The patient was identified by name and date of birth  Nick Reed was informed that this is a telemedicine visit and that the visit is being conducted through [employer]  My office door was closed  No one else was in the room  She acknowledged consent and understanding of privacy and security of the video platform  The patient has agreed to participate and understands they can discontinue the visit at any time  Patient is aware this is a billable service  Subjective  Nick Reed is a 72 y o  female here for follow-up of hyperthyroidism secondary to Graves disease and thyroid nodule  About 20+ years ago she was diagnosed with Graves disease at which time it was medically managed and then resolved  She has been off of medication for almost 20 years  Her most recent thyroid blood work has been within normal range, TSH 1 27 and free T4 1 4  Her total T3 is 86  She is currently not on thyroid medication  She does have a history of positive thyroid stimulating immunoglobulin  She also has a history of thyroid nodules  Last ultrasound was done on October 2019 which showed a multinodular thyroid gland which was stable with previous nonmalignant biopsy    Follow-up ultrasound is recommended in 12-24 months  US findings:  Right lobe:  5 4 x 2 3 x 2 2 cm  Left lobe:  4 4 x 1 3 x 1 6 cm  Isthmus:  0 2 cm      Nodule #1  Image 7  RIGHT midgland nodule measuring 1 9 x 1 7 x 1 6 cm  Given differences in measuring technique, no significant change from prior  COMPOSITION:  2 points, solid or almost completely solid   ECHOGENICITY:  2 points, hypoechoic  SHAPE:  3 points, taller-than-wide  MARGIN: 0 points, smooth  ECHOGENIC FOCI:  0 points, none or large comet-tail artifacts  TI-RADS Classification: TR 5 (7 or > points), Highly suspicious  FNA if > 1 cm  Follow if > 0 5 cm  This nodule has had a previous benign biopsy  As it is stable, no further sampling recommended at this time  Further surveillance at 2 year intervals   could be considered to confirm continued stability for a period of greater than 5 years      There are additional nodules of lesser size and/or TI-RADS score  These do not necessitate additional evaluation based on ACR criteria       IMPRESSION:     Multinodular thyroid thyroid gland stable with previous non-malignant biopsy results as above  Based on 2015 SANTOSH guidelines, additional followup ultrasound should be performed in 12 to 24 months  Results for Zeeshan Merrill (MRN [id number]) as of 9/9/2020 13:14   Ref   Range 2/24/2020 15:25 8/21/2020 09:08   TSH, POC Latest Ref Range: 0 40 - 4 50 mIU/L 1 76 1 27   Free T4 Latest Ref Range: 0 8 - 1 8 ng/dL 1 5 1 4   T3, Total Latest Ref Range: 76 - 181 ng/dL  86     Past Medical History:   Diagnosis Date    Graves disease     Hyperthyroidism     Skin cancer        Past Surgical History:   Procedure Laterality Date    TONSILLECTOMY      US GUIDED THYROID BIOPSY  8/30/2018       Current Outpatient Medications   Medication Sig Dispense Refill    estradiol (ESTRACE) 0 1 mg/g vaginal cream Apply pea sized amount to affected area two times per week at bedtime 42 5 g 4    triamterene-hydrochlorothiazide (DYAZIDE) 37 5-25 mg per capsule        No current facility-administered medications for this visit  Allergies   Allergen Reactions    Levofloxacin     Penicillins        Review of Systems   Constitutional: Positive for fatigue (chronic, attributes it to poor sleep)  Negative for activity change, appetite change and unexpected weight change  HENT: Negative for trouble swallowing  Eyes: Negative for visual disturbance  Respiratory: Negative for shortness of breath  Cardiovascular: Negative for chest pain and palpitations  Gastrointestinal: Negative for constipation and diarrhea  Endocrine: Negative for cold intolerance and heat intolerance  Musculoskeletal: Positive for arthralgias  Skin: Negative  Neurological: Negative for tremors  Psychiatric/Behavioral: Negative  Video Exam    There were no vitals filed for this visit  Physical Exam     Physical Exam   Constitutional: She is oriented to person, place, and time  She appears well-developed and well-nourished  No distress  HENT:   Head: Normocephalic and atraumatic  Neck: Normal range of motion  Pulmonary/Chest: Effort normal    Musculoskeletal: Normal range of motion  Neurological: She is alert and oriented to person, place, and time  Skin: She is not diaphoretic  Psychiatric: She has a normal mood and affect  Her behavior is normal      PLAN  Graves disease  History of positive TSI  Currently in remission; has been off of medication for approximately 20 years  Recent TFTs normal, TSH 1 27 and free T4 1 4  Continue to monitor   Call for signs/symptoms of hyperthyroidism  Thyroid nodule  Previous nonmalignant biopsy  Ordered repeat US      I spent 25 minutes directly with the patient during this visit    Natalia Alexis PA-C      VIRTUAL VISIT [address] [facility] acknowledges that she has consented to an online visit or consultation   She understands that the online visit is based solely on information provided by her, and that, in the absence of a face-to-face physical evaluation by the physician, the diagnosis she receives is both limited and provisional in terms of accuracy and completeness  This is not intended to replace a full medical face-to-face evaluation by the physician  Jonatan Mojica understands and accepts these terms

## 2020-12-10 ENCOUNTER — ANNUAL EXAM (OUTPATIENT)
Dept: OBGYN CLINIC | Facility: MEDICAL CENTER | Age: 65
End: 2020-12-10
Payer: MEDICARE

## 2020-12-10 VITALS — DIASTOLIC BLOOD PRESSURE: 84 MMHG | WEIGHT: 160 LBS | BODY MASS INDEX: 28.34 KG/M2 | SYSTOLIC BLOOD PRESSURE: 144 MMHG

## 2020-12-10 DIAGNOSIS — Z12.31 ENCOUNTER FOR SCREENING MAMMOGRAM FOR MALIGNANT NEOPLASM OF BREAST: ICD-10-CM

## 2020-12-10 DIAGNOSIS — Z79.890 POST-MENOPAUSE ON HRT (HORMONE REPLACEMENT THERAPY): ICD-10-CM

## 2020-12-10 DIAGNOSIS — Z01.419 ENCOUNTER FOR GYNECOLOGICAL EXAMINATION (GENERAL) (ROUTINE) WITHOUT ABNORMAL FINDINGS: Primary | ICD-10-CM

## 2020-12-10 PROCEDURE — G0101 CA SCREEN;PELVIC/BREAST EXAM: HCPCS | Performed by: NURSE PRACTITIONER

## 2021-03-10 DIAGNOSIS — Z23 ENCOUNTER FOR IMMUNIZATION: ICD-10-CM

## 2022-03-03 ENCOUNTER — HOSPITAL ENCOUNTER (OUTPATIENT)
Dept: RADIOLOGY | Facility: MEDICAL CENTER | Age: 67
Discharge: HOME | End: 2022-03-03
Payer: MEDICARE

## 2022-03-03 DIAGNOSIS — E04.1 THYROID NODULE: ICD-10-CM

## 2022-03-03 PROCEDURE — 76536 US EXAM OF HEAD AND NECK: CPT

## 2022-03-07 ENCOUNTER — TELEPHONE (OUTPATIENT)
Dept: ENDOCRINOLOGY | Facility: CLINIC | Age: 67
End: 2022-03-07

## 2022-03-07 NOTE — TELEPHONE ENCOUNTER
----- Message from Ab Gordillo PA-C sent at 3/7/2022  4:54 PM EST -----  Please call the patient regarding her result  Thyroid US shows mostly stable appearance of the right thyroid nodule which was previously biopsied  Follow-up in one year recommended

## 2022-03-11 ENCOUNTER — TELEPHONE (OUTPATIENT)
Dept: ENDOCRINOLOGY | Facility: CLINIC | Age: 67
End: 2022-03-11

## 2022-03-11 DIAGNOSIS — E04.1 THYROID NODULE: Primary | ICD-10-CM

## 2022-03-12 LAB
T4 FREE SERPL-MCNC: 1.2 NG/DL (ref 0.8–1.8)
TSH SERPL-ACNC: 1.01 MIU/L (ref 0.4–4.5)

## 2022-04-18 ENCOUNTER — TELEPHONE (OUTPATIENT)
Dept: ENDOCRINOLOGY | Facility: CLINIC | Age: 67
End: 2022-04-18

## 2022-04-18 NOTE — TELEPHONE ENCOUNTER
Her last appointment in 2020 was virtual and last in person was 2019  She should be seen in person this time

## 2022-04-20 ENCOUNTER — OFFICE VISIT (OUTPATIENT)
Dept: ENDOCRINOLOGY | Facility: CLINIC | Age: 67
End: 2022-04-20
Payer: MEDICARE

## 2022-04-20 VITALS
TEMPERATURE: 98.7 F | WEIGHT: 160 LBS | SYSTOLIC BLOOD PRESSURE: 140 MMHG | HEART RATE: 76 BPM | BODY MASS INDEX: 29.44 KG/M2 | DIASTOLIC BLOOD PRESSURE: 88 MMHG | HEIGHT: 62 IN

## 2022-04-20 DIAGNOSIS — E04.1 THYROID NODULE: Primary | ICD-10-CM

## 2022-04-20 DIAGNOSIS — Z86.39 H/O HYPERTHYROIDISM: ICD-10-CM

## 2022-04-20 PROCEDURE — 99213 OFFICE O/P EST LOW 20 MIN: CPT | Performed by: PHYSICIAN ASSISTANT

## 2022-04-20 NOTE — PROGRESS NOTES
Patient Progress Note    CC: thyroid nodule     Referring Provider  No referring provider defined for this encounter  History of Present Illness:     Garrick Riggs is a 79 y o  female here for follow-up of hyperthyroidism secondary to Graves disease and thyroid nodule  About 20+ years ago she was diagnosed with Graves disease at which time it was medically managed and then resolved  She has been off of medication for almost 20 years  Her most recent thyroid blood work has been within normal range, TSH 1 01 and free T4 1 2  She is currently not on thyroid medication  She does have a history of positive thyroid stimulating immunoglobulin  She also has a history of thyroid nodules  No family history of thyroid cancer  Last ultrasound was done on March 2022  FINDINGS:  Normal homogeneous smooth echotexture      Right lobe: 4 6 x 2 3 x 2 2 cm  Volume 11 0 mL  Left lobe:  4 3 x 1 8 x 1 6 cm  Volume 6 0 mL  Isthmus: 0 3  cm      Nodule #1  Image 7  RIGHT midgland nodule measuring 2 2 x 1 8 x 1 5 cm  Compared with 2 0 x 1 7 x 1 6 cm   COMPOSITION:  2 points, solid or almost completely solid   ECHOGENICITY:  1 point, hyperechoic or isoechoic  SHAPE:  0 points, wider-than-tall  MARGIN: 0 points, ill-defined  ECHOGENIC FOCI:  0 points, none or large comet-tail artifacts  TI-RADS Classification: TR 3 (3 points), FNA if >2 5 cm  Follow if >1 5 cm  This was previously biopsied on 8/30/2018 and categorized as benign [place] category 2     There are additional nodules of lesser size and/or TI-RADS score  These do not necessitate additional evaluation based on ACR criteria       IMPRESSION:     Mostly stable appearance of the right thyroid nodule which was previously biopsied  Follow-up in one year recommended       Patient Active Problem List   Diagnosis    Vaginal atrophy    Encounter for gynecological examination (general) (routine) without abnormal findings    Post-menopause on HRT (hormone replacement therapy)    Inclusion cyst of vulva    H/O hyperthyroidism    Thyroid nodule     Past Medical History:   Diagnosis Date    Graves disease     Hyperthyroidism     Skin cancer       Past Surgical History:   Procedure Laterality Date    TONSILLECTOMY      US GUIDED THYROID BIOPSY  8/30/2018      Family History   Problem Relation Age of Onset    Cancer Mother     Alzheimer's disease Father     Hypertension Father     Skin cancer Father     Skin cancer Brother     Cancer Paternal Grandmother     Skin cancer Paternal Aunt      Social History     Tobacco Use    Smoking status: Never Smoker    Smokeless tobacco: Never Used   Substance Use Topics    Alcohol use: No     Allergies   Allergen Reactions    Furosemide Swelling    Levofloxacin     Penicillins      Current Outpatient Medications   Medication Sig Dispense Refill    Estriol POWD       PROGESTERONE IM       triamterene-hydrochlorothiazide (DYAZIDE) 37 5-25 mg per capsule       estradiol (ESTRACE) 0 1 mg/g vaginal cream Apply pea sized amount to affected area two times per week at bedtime 42 5 g 4     No current facility-administered medications for this visit  Review of Systems   Constitutional: Positive for fatigue (improved)  Negative for activity change, appetite change and unexpected weight change  HENT: Negative for trouble swallowing  Eyes: Negative for visual disturbance  Respiratory: Negative for shortness of breath  Cardiovascular: Negative for chest pain and palpitations  Gastrointestinal: Negative for constipation and diarrhea  Endocrine: Negative for cold intolerance and heat intolerance  Musculoskeletal: Positive for arthralgias (arthritis)  Skin: Negative  Neurological: Negative for tremors  Psychiatric/Behavioral: Negative  Physical Exam:  Body mass index is 29 26 kg/m²    /88   Pulse 76   Temp 98 7 °F (37 1 °C) (Tympanic)   Ht 5' 2" (1 575 m)   Wt 72 6 kg (160 lb) BMI 29 26 kg/m²    Wt Readings from Last 3 Encounters:   04/20/22 72 6 kg (160 lb)   12/10/20 72 6 kg (160 lb)   02/26/20 71 2 kg (157 lb)       Physical Exam  Vitals and nursing note reviewed  Constitutional:       Appearance: She is well-developed  HENT:      Head: Normocephalic  Eyes:      General: No scleral icterus  Pupils: Pupils are equal, round, and reactive to light  Neck:      Thyroid: Thyromegaly (right sided) present  Cardiovascular:      Rate and Rhythm: Normal rate and regular rhythm  Pulses:           Radial pulses are 2+ on the right side and 2+ on the left side  Heart sounds: No murmur heard  Pulmonary:      Effort: Pulmonary effort is normal  No respiratory distress  Breath sounds: Normal breath sounds  No wheezing  Musculoskeletal:      Cervical back: Neck supple  Skin:     General: Skin is warm and dry  Neurological:      Mental Status: She is alert  Deep Tendon Reflexes: Reflexes are normal and symmetric  Patient's shoes and socks were not removed  Labs:   No results found for: HGBA1C    No results found for: CHOL, HDL, TRIG, CHOLHDL    No results found for: GLUCOSE, CALCIUM, NA, K, CO2, CL, BUN, CREATININE     No results found for: EGFR    No results found for: ALT, AST, GGT, ALKPHOS, BILITOT    Lab Results   Component Value Date    TSH 1 01 03/11/2022    [id number] 86 08/21/2020         Plan:    Diagnoses and all orders for this visit:    Thyroid nodule  US showed a stable right nodule with prior biopsy   Repeat in 1 year  -     US thyroid; Future    H/O hyperthyroidism  History of Graves disease   In remission / not on thyroid medication  TFTs are normal, TSH 1 01 and free T4 1 2  Monitor labs  Call of signs of hyperthyroidism  -     T4, free; Future  -     TSH, 3rd generation; Future          Discussed with the patient and all questions fully answered  She will call me if any problems arise      Counseled patient on diagnostic results, prognosis, risk and benefit of treatment options, instruction for management, importance of treatment compliance, risk  factor reduction and impressions      Maria C Lux PA-C

## 2022-10-24 DIAGNOSIS — Z12.31 ENCOUNTER FOR SCREENING MAMMOGRAM FOR MALIGNANT NEOPLASM OF BREAST: ICD-10-CM

## 2022-11-08 ENCOUNTER — TELEPHONE (OUTPATIENT)
Dept: OBGYN CLINIC | Facility: CLINIC | Age: 67
End: 2022-11-08

## 2022-11-08 DIAGNOSIS — R92.2 DENSE BREAST TISSUE ON MAMMOGRAM: Primary | ICD-10-CM

## 2022-11-08 NOTE — TELEPHONE ENCOUNTER
Spoke to pt and reviewed results and recommendations from their mammo per  Estella  If wants LARISA let us know

## 2022-11-08 NOTE — TELEPHONE ENCOUNTER
----- Message from [facility], [address] sent at 11/8/2022 11:15 AM EST -----  She is a [facility] patient  Please call about the following: Your mammogram is normal but your breast tissue is extremely dense, which limits the sensitivity of the mammogram and may obscure small masses  An automated breast ultrasound (ABUS) is recommended and ordered  Please check your insurance coverage prior to completing this test to know of possible out of pocket expenses  You can call central scheduling to choose the earliest date convenient for you at [phone]  Continue with monthly breast self exam and annual mammogram  Will consider ABUS with next screening mammogram    Call office with any questions at [phone]

## 2022-11-14 ENCOUNTER — OFFICE VISIT (OUTPATIENT)
Dept: OBGYN CLINIC | Facility: CLINIC | Age: 67
End: 2022-11-14

## 2022-11-14 VITALS — SYSTOLIC BLOOD PRESSURE: 124 MMHG | BODY MASS INDEX: 29.81 KG/M2 | WEIGHT: 163 LBS | DIASTOLIC BLOOD PRESSURE: 82 MMHG

## 2022-11-14 DIAGNOSIS — N64.4 MASTALGIA: Primary | ICD-10-CM

## 2022-11-14 RX ORDER — CLOTRIMAZOLE AND BETAMETHASONE DIPROPIONATE 10; .64 MG/G; MG/G
CREAM TOPICAL 2 TIMES DAILY
Qty: 30 G | Refills: 0 | Status: SHIPPED | OUTPATIENT
Start: 2022-11-14

## 2022-11-14 NOTE — ASSESSMENT & PLAN NOTE
Pt with 2 wk hx of mastalgia that is new for her  Breast exam wnl, other than tender areas as marked  No obvious triggers  Recent mammo last month, and WNL, but very dense breasts    Low suspicion for malignancy, but will check Breast US and given topical steroid to see if helpful to nipple c/o

## 2022-11-14 NOTE — PROGRESS NOTES
Assessment/Plan:       1  Mastalgia  Assessment & Plan:  Pt with 2 wk hx of mastalgia that is new for her  Breast exam wnl, other than tender areas as marked  No obvious triggers  Recent mammo last month, and WNL, but very dense breasts  Low suspicion for malignancy, but will check Breast US and given topical steroid to see if helpful to nipple c/o     Orders:  -     US breast bilateral limited (diagnostic); Future; Expected date: 11/14/2022  -     clotrimazole-betamethasone (LOTRISONE) 1-0 05 % cream; Apply topically 2 (two) times a day          Subjective:      Patient ID: Kendra Schaeffer is a 79 y o  female  She is here for Breast Problem (Pt C/o nipple soreness and tenderness in breast has to leave bra on in order to help pain started 2 weeks ago tried zinc oxide for pain )    HPI  Pt with 2 wk hx of bilateral breast pain  B/L nipples burning sensation and feeling like being poked with a needle  Now getting tender in outer quadrants like when she was PMS  Just completed screening mammogram on 10/21 BR-2- Very dense breasts- supplemental screening recommended    Sees Dr Sarah Hsieh for HRT and is [employer] hormones estriol and progesterone  Denies excess caffeine intake  Denies excess alcohol intake  Denies new medications or medical conditions    Menstrual History:  No LMP recorded  Patient is postmenopausal         The following portions of the patient's history were reviewed and updated as appropriate: allergies, current medications, past family history, past medical history, past social history, past surgical history, and problem list     Review of Systems  See HPI for pertinent positives          Objective:    /82 (BP Location: Left arm, Patient Position: Sitting, Cuff Size: Standard)   Wt 73 9 kg (163 lb)   BMI 29 81 kg/m²      Physical Exam  Constitutional:       General: She is not in acute distress  Appearance: Normal appearance     Genitourinary:      Genitourinary Comments: Pelvic exam deferred     Cardiovascular:      Rate and Rhythm: Normal rate  Pulmonary:      Effort: Pulmonary effort is normal    Chest:       Abdominal:      General: There is no distension  Palpations: Abdomen is soft  Musculoskeletal:         General: Normal range of motion  Neurological:      Mental Status: She is alert and oriented to person, place, and time  Skin:     General: Skin is warm and dry     Psychiatric:         Mood and Affect: Mood normal          Behavior: Behavior normal

## 2022-12-15 NOTE — PROGRESS NOTES
Assessment/Plan:  Calcium 1200-1500mg + 800-1000 IU Vit D daily unless otherwise directed  Avoid falls  Reminded to separate doses  Plans to remain off progesterone tablets  Will use DHA  estriol compounded cream intravaginally twice weekly as prescribed by Dr Dino Vicente  Keep breast pain calendar as discussed  Exercise 150 minutes per week minimum including weight bearing exercises  DEXA scan-  refused       No further paps after age 72 as long as there has been adequate normal paps completed, no history of SHERIN 2  or a more severe pap diagnosis in the last 20 years  Annual mammogram ordered and monthly breast self exam recommended   Colonoscopy- at age 79          Kegels 20 times twice daily  Vaginal moisturizers twice weekly as needed  Return to office in one year or sooner, if needed  1  Encntr for gyn exam (general) (routine) w/o abn findings    2  Encounter for screening mammogram for breast cancer  -     Mammo screening bilateral w 3d & cad; Future; Expected date: 10/21/2023    3  Mastalgia               Subjective:      Patient ID: Whitten Lipoma is a 79 y o  female  HPI    Whitten Lipoma is a 79 y o  [facility] female who is here today for her annual visit  No vaginal bleeding x years  Exercise- 5 times per week x 20 minutes  Whitten Lipoma is not sexually active with male partner/  of 30 years  This is acceptable to her  Monogamous and feels safe in this relationship  Denies vaginal pain,bleeding or dryness  She uses a compounded estriol/DHEA gel and estradiol cream intravaginally  She discontinued her progesterone tablets since 12/11/22  Susy Matute She was last seen in office on 11/14/22 for mastalgia  Breast US on 11/21/22 was normal  The breast pain has lessened this week  Last weeks breast pain rating was  10/10-any palpation was extremely painful  The pain now is 4/10  The only changed was the elimination of her progesterone tablets   (She is under the care of  Si Cones )   The lack of progesterone has altered her sleep cycle  She has nighttime waking with some difficulty falling back to sleep  She is not interested in STD screening today  She denies vaginal discharge, itching or pelvic pain  She has no  urinary concerns, does not have incontinence  No bowel concerns  No breast concerns  Last pap: 12/4/19 normal pap with negative HR HPV, admits to consistent normal lifetime paps, 8/18/16 normal  DEXA scan: Refused  Mammogram: 10/24/22  Normal with extremely dense tissue; ABUS ordered  Colonoscopy: Up to date; done around age 61    The following portions of the patient's history were reviewed and updated as appropriate: allergies, current medications, past family history, past medical history, past social history, past surgical history and problem list     Review of Systems   Constitutional: Negative  Negative for activity change, appetite change, chills, diaphoresis, fatigue, fever and unexpected weight change  HENT: Negative for congestion, dental problem, sneezing, sore throat and trouble swallowing  Eyes: Negative for visual disturbance  Respiratory: Negative for chest tightness and shortness of breath  Cardiovascular: Negative for chest pain and leg swelling  Gastrointestinal: Negative for abdominal pain, constipation, diarrhea, nausea and vomiting  Genitourinary: Negative for difficulty urinating, dysuria, frequency, hematuria, pelvic pain, urgency, vaginal bleeding, vaginal discharge and vaginal pain  Musculoskeletal: Negative for back pain and neck pain  Mastalgia   Skin: Negative  Allergic/Immunologic: Negative  Neurological: Negative for weakness and headaches  Hematological: Negative for adenopathy  Psychiatric/Behavioral: Positive for sleep disturbance  Negative for agitation and behavioral problems           Objective:      /78   Ht 5' 2" (1 575 m)   Wt 72 5 kg (159 lb 12 8 oz)   BMI 29 23 kg/m² Physical Exam  Vitals and nursing note reviewed  Constitutional:       Appearance: Normal appearance  She is well-developed  HENT:      Head: Normocephalic  Neck:      Thyroid: No thyromegaly  Cardiovascular:      Rate and Rhythm: Normal rate and regular rhythm  Heart sounds: Normal heart sounds  Pulmonary:      Effort: Pulmonary effort is normal       Breath sounds: Normal breath sounds  Chest:   Breasts:     Breasts are symmetrical       Right: Tenderness present  No inverted nipple, mass, nipple discharge or skin change  Left: Tenderness present  No inverted nipple, mass, nipple discharge or skin change  Comments: Areas of increased tenderness  Abdominal:      Palpations: Abdomen is soft  Genitourinary:     General: Normal vulva  Exam position: Lithotomy position  Labia:         Right: No rash, tenderness, lesion or injury  Left: No rash, tenderness, lesion or injury  Urethra: No prolapse, urethral pain, urethral swelling or urethral lesion  Vagina: No signs of injury and foreign body  No vaginal discharge, erythema, tenderness, bleeding, lesions or prolapsed vaginal walls  Cervix: Normal       Uterus: Normal        Adnexa: Right adnexa normal and left adnexa normal         Right: No mass, tenderness or fullness  Left: No mass, tenderness or fullness  Rectum: No external hemorrhoid  Comments: Vulvovaginal atrophy  Musculoskeletal:         General: Normal range of motion  Cervical back: Normal range of motion  Lymphadenopathy:      Head:      Right side of head: No submental, submandibular, tonsillar or occipital adenopathy  Left side of head: No submental, submandibular, tonsillar or occipital adenopathy  Upper Body:      Right upper body: No supraclavicular or axillary adenopathy  Left upper body: No supraclavicular or axillary adenopathy  Lower Body: No right inguinal adenopathy   No left inguinal adenopathy  Skin:     General: Skin is warm and dry  Neurological:      Mental Status: She is alert and oriented to person, place, and time  Psychiatric:         Mood and Affect: Mood normal          Behavior: Behavior normal  Behavior is cooperative

## 2022-12-16 ENCOUNTER — ANNUAL EXAM (OUTPATIENT)
Dept: OBGYN CLINIC | Facility: MEDICAL CENTER | Age: 67
End: 2022-12-16

## 2022-12-16 VITALS
SYSTOLIC BLOOD PRESSURE: 136 MMHG | WEIGHT: 159.8 LBS | HEIGHT: 62 IN | BODY MASS INDEX: 29.4 KG/M2 | DIASTOLIC BLOOD PRESSURE: 78 MMHG

## 2022-12-16 DIAGNOSIS — Z12.31 ENCOUNTER FOR SCREENING MAMMOGRAM FOR BREAST CANCER: ICD-10-CM

## 2022-12-16 DIAGNOSIS — N64.4 MASTALGIA: ICD-10-CM

## 2022-12-16 DIAGNOSIS — Z01.419 ENCNTR FOR GYN EXAM (GENERAL) (ROUTINE) W/O ABN FINDINGS: Primary | ICD-10-CM

## 2022-12-16 RX ORDER — ESTRADIOL 0.1 MG/G
2 CREAM VAGINAL DAILY
COMMUNITY

## 2022-12-16 NOTE — PATIENT INSTRUCTIONS
Calcium 1200-1500mg + 800-1000 IU Vit D daily unless otherwise directed  Avoid falls  Reminded to separate doses  Keep breast pain calendar as discussed  Exercise 150 minutes per week minimum including weight bearing exercises  DEXA scan-  refused       No further paps after age 72 as long as there has been adequate normal paps completed, no history of SHERIN 2  or a more severe pap diagnosis in the last 20 years  Annual mammogram ordered and monthly breast self exam recommended   Colonoscopy- at age 79          Kegels 20 times twice daily  Vaginal moisturizers twice weekly as needed  Return to office in one year or sooner, if needed

## 2023-03-03 ENCOUNTER — NEW PATIENT (OUTPATIENT)
Dept: URBAN - METROPOLITAN AREA CLINIC 6 | Facility: CLINIC | Age: 68
End: 2023-03-03

## 2023-03-03 DIAGNOSIS — Z01.00: ICD-10-CM

## 2023-03-03 DIAGNOSIS — H52.13: ICD-10-CM

## 2023-03-03 PROCEDURE — 92015 DETERMINE REFRACTIVE STATE: CPT | Mod: NC

## 2023-03-03 PROCEDURE — 92004 COMPRE OPH EXAM NEW PT 1/>: CPT

## 2023-03-03 ASSESSMENT — KERATOMETRY
OD_K1POWER_DIOPTERS: 43.25
OS_AXISANGLE2_DEGREES: 112
OS_AXISANGLE_DEGREES: 22
OS_K2POWER_DIOPTERS: 43.50
OD_K2POWER_DIOPTERS: 43.50
OD_AXISANGLE2_DEGREES: 77
OD_AXISANGLE_DEGREES: 167
OS_K1POWER_DIOPTERS: 43.00

## 2023-03-03 ASSESSMENT — VISUAL ACUITY
OS_CC: 20/30-2
OD_CC: J1
OD_CC: 20/50
OS_CC: J1

## 2023-03-03 ASSESSMENT — TONOMETRY
OD_IOP_MMHG: 16
OS_IOP_MMHG: 17

## 2023-03-13 ENCOUNTER — CONTACT LENS FITTING (OUTPATIENT)
Dept: URBAN - METROPOLITAN AREA CLINIC 6 | Facility: CLINIC | Age: 68
End: 2023-03-13

## 2023-03-13 DIAGNOSIS — H52.203: ICD-10-CM

## 2023-03-13 PROCEDURE — 92310 CONTACT LENS FITTING OU: CPT

## 2023-03-13 ASSESSMENT — KERATOMETRY
OD_K1POWER_DIOPTERS: 43.25
OS_AXISANGLE2_DEGREES: 112
OD_AXISANGLE2_DEGREES: 77
OD_K2POWER_DIOPTERS: 43.50
OD_AXISANGLE_DEGREES: 167
OS_AXISANGLE_DEGREES: 22
OS_K1POWER_DIOPTERS: 43.00
OS_K2POWER_DIOPTERS: 43.50

## 2023-03-24 ENCOUNTER — CONTACT LENS - FOLLOW UP (OUTPATIENT)
Dept: URBAN - METROPOLITAN AREA CLINIC 6 | Facility: CLINIC | Age: 68
End: 2023-03-24

## 2023-03-24 DIAGNOSIS — H52.13: ICD-10-CM

## 2023-03-24 DIAGNOSIS — H52.203: ICD-10-CM

## 2023-03-24 PROCEDURE — 92310 CONTACT LENS FITTING OU: CPT | Mod: NC

## 2023-03-24 ASSESSMENT — KERATOMETRY
OD_K1POWER_DIOPTERS: 43.25
OS_K1POWER_DIOPTERS: 43.00
OS_AXISANGLE_DEGREES: 22
OD_AXISANGLE2_DEGREES: 77
OS_AXISANGLE2_DEGREES: 112
OS_K2POWER_DIOPTERS: 43.50
OD_K2POWER_DIOPTERS: 43.50
OD_AXISANGLE_DEGREES: 167

## 2023-03-24 ASSESSMENT — VISUAL ACUITY
OS_CC: 20/20-2
OD_CC: 20/30+1

## 2023-04-28 ENCOUNTER — TELEPHONE (OUTPATIENT)
Dept: ENDOCRINOLOGY | Facility: CLINIC | Age: 68
End: 2023-04-28

## 2023-04-28 DIAGNOSIS — Z86.39 H/O HYPERTHYROIDISM: ICD-10-CM

## 2023-04-28 DIAGNOSIS — E04.1 THYROID NODULE: Primary | ICD-10-CM

## 2023-04-29 ENCOUNTER — TELEPHONE (OUTPATIENT)
Dept: OTHER | Facility: OTHER | Age: 68
End: 2023-04-29

## 2023-04-29 NOTE — TELEPHONE ENCOUNTER
Patient is requesting a new script for TSH + Free T4 be mailed to her home address or placed in her [employer] account since she will be going out of network for her lab work  Please call back to address

## 2023-05-06 LAB
T4 FREE SERPL-MCNC: 1.4 NG/DL (ref 0.8–1.8)
TSH SERPL-ACNC: 1.13 MIU/L (ref 0.4–4.5)

## 2023-05-09 ENCOUNTER — OFFICE VISIT (OUTPATIENT)
Dept: ENDOCRINOLOGY | Facility: CLINIC | Age: 68
End: 2023-05-09

## 2023-05-09 VITALS
SYSTOLIC BLOOD PRESSURE: 130 MMHG | BODY MASS INDEX: 28.93 KG/M2 | WEIGHT: 157.2 LBS | HEART RATE: 63 BPM | HEIGHT: 62 IN | DIASTOLIC BLOOD PRESSURE: 84 MMHG

## 2023-05-09 DIAGNOSIS — E04.1 THYROID NODULE: Primary | ICD-10-CM

## 2023-05-09 DIAGNOSIS — Z86.39 H/O HYPERTHYROIDISM: ICD-10-CM

## 2023-05-09 NOTE — PROGRESS NOTES
Brandy Sacks 76 y o  female MRN: [id number]    Encounter: [id number]      Assessment/Plan     Assessment: This is a 76y o -year-old female with thyroid nodule   Plan:    Diagnoses and all orders for this visit:    Thyroid nodule  Thyroid ultrasound was done in March 2022 which showed right mid gland nodule measuring 2 2 x 1 8 x 1 5 cm this was previously biopsied in 2018 and categorized as benign [place] category 2  Obtain thyroid ultrasound now, for follow-up  Will order thyroid ultrasound to follow-up again in 1 year  Repeated patient to call if she develops any obstructive symptoms, such as difficulty swallowing, change in voice  -     T4, free; Future  -     TSH, 3rd generation; Future  -     US thyroid; Future  -     T4, free  -     TSH, 3rd generation  -     US thyroid; Future    H/O hyperthyroidism  Lab Results   Component Value Date    TSH 1 13 05/05/2023    [id number] 86 08/21/2020   Thyroid function test is within normal range  Continue to monitor TFT every year    -     T4, free; Future  -     TSH, 3rd generation; Future  -     US thyroid; Future  -     T4, free  -     TSH, 3rd generation        CC:   Thyroid nodule, history of hyperthyroidism    History of Present Illness     HPI:    Brandy Sacks is 58-year-old female with medical history of hypothyroidism secondary to Graves' disease, thyroid nodule is here for follow-up  She was diagnosed with Graves' disease 20 years ago was managed medically and then it was resolved, she has been off the medication for almost 20 years  Her thyroid blood work has been within normal range  Most recent TSH is    Lab Results   Component Value Date    TSH 1 13 05/05/2023    [id number] 86 08/21/2020   Thyroid ultrasound was done in March 2022 which showed right mid gland nodule measuring 2 2 x 1 8 x 1 5 cm this was previously biopsied in 2018 and categorized as benign [place] category 2  She denies previous history of radiation to the neck or face    She denies family history of thyroid cancers  She denies obstructive symptoms such as difficulty swallowing or change in voice    Review of Systems   Constitutional: Negative for activity change, diaphoresis, fatigue, fever and unexpected weight change  HENT: Negative  Eyes: Negative for visual disturbance  Respiratory: Negative for cough, chest tightness and shortness of breath  Cardiovascular: Negative for chest pain, palpitations and leg swelling  Gastrointestinal: Negative for abdominal pain, blood in stool, constipation, diarrhea, nausea and vomiting  Endocrine: Negative for cold intolerance, heat intolerance, polydipsia, polyphagia and polyuria  Genitourinary: Negative for dysuria, enuresis, frequency and urgency  Musculoskeletal: Negative for arthralgias and myalgias  Skin: Negative for pallor, rash and wound  Allergic/Immunologic: Negative  Neurological: Negative for dizziness, tremors, weakness and numbness  Hematological: Negative  Psychiatric/Behavioral: Negative          Historical Information   Past Medical History:   Diagnosis Date   • Graves disease    • Hyperthyroidism    • Skin cancer      Past Surgical History:   Procedure Laterality Date   • TONSILLECTOMY     • US GUIDED THYROID BIOPSY  8/30/2018     Social History   Social History     Substance and Sexual Activity   Alcohol Use Not Currently     Social History     Substance and Sexual Activity   Drug Use Never     Social History     Tobacco Use   Smoking Status Never   Smokeless Tobacco Never     Family History:   Family History   Problem Relation Age of Onset   • Cancer Mother    • Alzheimer's disease Father    • Hypertension Father    • Skin cancer Father    • Skin cancer Brother    • Cancer Paternal Grandmother    • Skin cancer Paternal Aunt    • Breast cancer Neg Hx    • Ovarian cancer Neg Hx    • Colon cancer Neg Hx        Meds/Allergies   Current Outpatient Medications   Medication Sig Dispense Refill   • estradiol "(ESTRACE) 0 1 mg/g vaginal cream Insert 2 g into the vagina daily     • triamterene-hydrochlorothiazide (DYAZIDE) 37 5-25 mg per capsule Take 1 capsule by mouth daily     • clotrimazole-betamethasone (LOTRISONE) 1-0 05 % cream Apply topically 2 (two) times a day (Patient not taking: Reported on 12/16/2022) 30 g 0     No current facility-administered medications for this visit  Allergies   Allergen Reactions   • Furosemide Swelling   • Levofloxacin Swelling   • Penicillins Swelling       Objective   Vitals: Blood pressure 130/84, pulse 63, height 5' 2\" (1 575 m), weight 71 3 kg (157 lb 3 2 oz)  Physical Exam  Vitals reviewed  Constitutional:       General: She is not in acute distress  Appearance: Normal appearance  She is not ill-appearing  HENT:      Head: Normocephalic and atraumatic  Nose: Nose normal    Eyes:      Extraocular Movements: Extraocular movements intact  Conjunctiva/sclera: Conjunctivae normal    Cardiovascular:      Rate and Rhythm: Normal rate and regular rhythm  Pulses: Normal pulses  Heart sounds: Normal heart sounds  Pulmonary:      Effort: No respiratory distress  Musculoskeletal:         General: Normal range of motion  Cervical back: Normal range of motion and neck supple  Skin:     General: Skin is warm and dry  Findings: No rash  Neurological:      General: No focal deficit present  Mental Status: She is alert and oriented to person, place, and time  Psychiatric:         Mood and Affect: Mood normal          Behavior: Behavior normal          The history was obtained from the review of the chart, patient  Lab Results:   Lab Results   Component Value Date/Time    Free t4 1 4 05/05/2023 10:38 AM       Imaging Studies:   Results for orders placed during the hospital encounter of 03/03/22    US thyroid    Impression  Mostly stable appearance of the right thyroid nodule which was previously biopsied    Follow-up in one year " "recommended  Reference: ACR Thyroid Imaging, Reporting and Data System (TI-RADS): White Paper of the [employer]  J AM Chely Radiol 0389;16:021-513  (additional recommendations based on American Thyroid Association 2015 guidelines )      Workstation performed: [id number]      I have personally reviewed pertinent reports  Portions of the record may have been created with voice recognition software  Occasional wrong word or \"sound a like\" substitutions may have occurred due to the inherent limitations of voice recognition software  Read the chart carefully and recognize, using context, where substitutions have occurred    "

## 2023-05-19 ENCOUNTER — HOSPITAL ENCOUNTER (OUTPATIENT)
Dept: RADIOLOGY | Facility: MEDICAL CENTER | Age: 68
Discharge: HOME | End: 2023-05-19

## 2023-05-19 DIAGNOSIS — E04.1 THYROID NODULE: ICD-10-CM

## 2023-09-21 ENCOUNTER — APPOINTMENT (OUTPATIENT)
Dept: RADIOLOGY | Facility: MEDICAL CENTER | Age: 68
End: 2023-09-21
Payer: MEDICARE

## 2023-09-21 DIAGNOSIS — M79.662 PAIN OF LEFT LOWER LEG: ICD-10-CM

## 2023-09-21 PROCEDURE — 73590 X-RAY EXAM OF LOWER LEG: CPT

## 2023-10-04 ENCOUNTER — OFFICE VISIT (OUTPATIENT)
Dept: OBGYN CLINIC | Facility: CLINIC | Age: 68
End: 2023-10-04
Payer: MEDICARE

## 2023-10-04 VITALS
BODY MASS INDEX: 28.89 KG/M2 | WEIGHT: 157 LBS | DIASTOLIC BLOOD PRESSURE: 83 MMHG | HEART RATE: 73 BPM | HEIGHT: 62 IN | SYSTOLIC BLOOD PRESSURE: 149 MMHG

## 2023-10-04 DIAGNOSIS — M79.605 LEFT LEG PAIN: Primary | ICD-10-CM

## 2023-10-04 DIAGNOSIS — M84.30XA STRESS REACTION OF BONE: ICD-10-CM

## 2023-10-04 PROCEDURE — 99204 OFFICE O/P NEW MOD 45 MIN: CPT | Performed by: ORTHOPAEDIC SURGERY

## 2023-10-04 RX ORDER — METHYLPREDNISOLONE 4 MG/1
TABLET ORAL
Qty: 1 EACH | Refills: 0 | Status: SHIPPED | OUTPATIENT
Start: 2023-10-04

## 2023-10-04 RX ORDER — IBUPROFEN 200 MG
TABLET ORAL
COMMUNITY

## 2023-10-04 RX ORDER — NEOMYCIN SULFATE, POLYMYXIN B SULFATE, HYDROCORTISONE 3.5; 10000; 1 MG/ML; [USP'U]/ML; MG/ML
SOLUTION/ DROPS AURICULAR (OTIC)
COMMUNITY
Start: 2023-09-15

## 2023-10-04 NOTE — PROGRESS NOTES
Assessment:  1. Left leg pain  methylPREDNISolone 4 MG tablet therapy pack    MRI tibia fibula left wo contrast    Crutches        Patient Active Problem List   Diagnosis   • Vaginal atrophy   • Encounter for gynecological examination (general) (routine) without abnormal findings   • Post-menopause on HRT (hormone replacement therapy)   • Inclusion cyst of vulva   • H/O hyperthyroidism   • Thyroid nodule   • Mastalgia   • Left leg pain       Plan:    76 y.o. female with left tibia pain for past 2.5 weeks, ddx includes periostitis, stress reaction, and stress fracture as well as nerve or vascular related pain    • Discussed with patient that likely this is periostitis vs stress reaction  • Medrol dose pack prescribed if no relief she will get MRI left tib fib  • If MRI shows stress fracture will discuss conservative treatment likely with period of NWB, if she fails this could consider tibial IMN but unlikely this would be needed  • If steroid doesn't work and MRI is negative this would be either nerve or vascular  • Will give her crutches to use as neeed      The patient was seen and examined by Dr. Keith Power and myself. The assessment and plan were formulated by Dr. Keith Power and I assisted in carrying it out. Subjective:   Patient ID: Zach Arias is a 76 y.o. female . HPI    Patient comes in today with regards to left leg pain. Patient is referred to us by Yodit Garcia DO for further evaluation. The patient reports that the pain been going on for 2.5 weeks. Patient rates their pain as severe. Injury or trauma prior to onset of pain: none , started as she was on her feet at her house no specific injury. Pain is located in the anterior mid tibia. It is worsened with weight bearing, having leg in dependent position, and is made better with rest and keep knee extended. Treatments tried: non . The pain does not radiate . Old injuries or prior surgeries: none. Numbness or tingling: none .       The following portions of the patient's history were reviewed and updated as appropriate: allergies, current medications, past family history, past social history, past surgical history and problem list.    Social History     Socioeconomic History   • Marital status: /Civil Union     Spouse name: Not on file   • Number of children: Not on file   • Years of education: Not on file   • Highest education level: Not on file   Occupational History   • Not on file   Tobacco Use   • Smoking status: Never   • Smokeless tobacco: Never   Vaping Use   • Vaping Use: Never used   Substance and Sexual Activity   • Alcohol use: Not Currently   • Drug use: Never   • Sexual activity: Not Currently     Partners: Male     Birth control/protection: Post-menopausal   Other Topics Concern   • Not on file   Social History Narrative   • Not on file     Social Determinants of Health     Financial Resource Strain: Not on file   Food Insecurity: Not on file   Transportation Needs: Not on file   Physical Activity: Not on file   Stress: Not on file   Social Connections: Not on file   Intimate Partner Violence: Not on file   Housing Stability: Not on file     Past Medical History:   Diagnosis Date   • Graves disease    • Hyperthyroidism    • Skin cancer      Past Surgical History:   Procedure Laterality Date   • TONSILLECTOMY     • US GUIDED THYROID BIOPSY  8/30/2018     Allergies   Allergen Reactions   • Furosemide Swelling   • Levofloxacin Swelling   • Penicillins Swelling     Current Outpatient Medications on File Prior to Visit   Medication Sig Dispense Refill   • clotrimazole-betamethasone (LOTRISONE) 1-0.05 % cream Apply topically 2 (two) times a day (Patient not taking: Reported on 12/16/2022) 30 g 0   • estradiol (ESTRACE) 0.1 mg/g vaginal cream Insert 2 g into the vagina daily     • ibuprofen (MOTRIN) 200 mg tablet Take by mouth     • neomycin-polymyxin-hydrocortisone (CORTISPORIN) 1 % SOLN instill 2 drops INTO AFFECTED EAR(S) four times a day • triamterene-hydrochlorothiazide (DYAZIDE) 37.5-25 mg per capsule Take 1 capsule by mouth daily       No current facility-administered medications on file prior to visit. Review of Systems      Objective:    Vitals:    10/04/23 0935   BP: 149/83   Pulse: 73       Physical Exam  Musculoskeletal:      Left knee: No effusion. Left Knee Exam     Range of Motion   The patient has normal left knee ROM. Other   Erythema: absent  Scars: absent  Sensation: normal  Pulse: present  Swelling: mild  Effusion: no effusion present    Comments:  Tenderness palpation over the distal third about a 2 to 3 cm region of the tibial spine, no increased pain with tuning fork evaluation  Negative slump test            I have personally reviewed pertinent films in PACS and my interpretation is XR of left tib fib from 9/21/23 shows no acute fracture or evidence of stress reaction on plain film. Procedures  No Procedures performed today    Scribe Attestation    I,:  Radha Matias PA-C am acting as a scribe while in the presence of the attending physician.:       I,:  Alana Fernando DO personally performed the services described in this documentation    as scribed in my presence.:             Portions of the record may have been created with voice recognition software. Occasional wrong word or "sound a like" substitutions may have occurred due to the inherent limitations of voice recognition software. Read the chart carefully and recognize, using context, where substitutions have occurred.

## 2023-10-09 ENCOUNTER — TELEPHONE (OUTPATIENT)
Age: 68
End: 2023-10-09

## 2023-10-09 NOTE — TELEPHONE ENCOUNTER
Called and spoke w/pt and relayed Dr Shalonda Chen. She is currently scheduled for a MRI on 10/17/23 at 1:30. Scheduled for review of MRI on 10/20/21 per pt's request (will cancel if not read by then and reschedule). She will call to see if there are any cancellations for an earlier MRI. Advised pt that she should consider taking Ibuprofen 200mg 2 tabs every 6 hours instead of every 3 hours as this is hard on the kidneys and add Tylenol ES 500mg 2 tabs every 8 hours to help w/pain. She is wrapping leg, elevating and using crutches.

## 2023-10-09 NOTE — TELEPHONE ENCOUNTER
Caller: Patient    Doctor: Ruth Yu    Reason for call: Patient was recently seen on 10/4/23. Patient was placed on methylPREDNISolone 4 MG tablet therapy pack. Patient feels like it did not work. Patient states her swelling double the day after the appointment.  Patient states when she elavate it goes down  Patient is taking Ibuprofen 400 every three hours     Call back#: [phone]

## 2023-10-13 ENCOUNTER — HOSPITAL ENCOUNTER (OUTPATIENT)
Dept: RADIOLOGY | Facility: IMAGING CENTER | Age: 68
End: 2023-10-13
Payer: MEDICARE

## 2023-10-13 DIAGNOSIS — M79.605 LEFT LEG PAIN: ICD-10-CM

## 2023-10-13 PROCEDURE — G1004 CDSM NDSC: HCPCS

## 2023-10-13 PROCEDURE — 73718 MRI LOWER EXTREMITY W/O DYE: CPT

## 2023-10-18 ENCOUNTER — TELEPHONE (OUTPATIENT)
Dept: OBGYN CLINIC | Facility: HOSPITAL | Age: 68
End: 2023-10-18

## 2023-10-18 ENCOUNTER — OFFICE VISIT (OUTPATIENT)
Dept: OBGYN CLINIC | Facility: CLINIC | Age: 68
End: 2023-10-18
Payer: MEDICARE

## 2023-10-18 VITALS — WEIGHT: 156 LBS | HEIGHT: 62 IN | BODY MASS INDEX: 28.71 KG/M2

## 2023-10-18 DIAGNOSIS — M60.862 MYOSITIS OF LEFT LOWER LEG, UNSPECIFIED MYOSITIS TYPE: Primary | ICD-10-CM

## 2023-10-18 PROCEDURE — 99213 OFFICE O/P EST LOW 20 MIN: CPT | Performed by: ORTHOPAEDIC SURGERY

## 2023-10-18 NOTE — PROGRESS NOTES
Assessment:  1. Myositis of left lower leg, unspecified myositis type  Cam Boot    Ambulatory Referral to Interventional Radiology    CANCELED: Ambulatory Referral to Interventional Radiology        Patient Active Problem List   Diagnosis   • Vaginal atrophy   • Encounter for gynecological examination (general) (routine) without abnormal findings   • Post-menopause on HRT (hormone replacement therapy)   • Inclusion cyst of vulva   • H/O hyperthyroidism   • Thyroid nodule   • Mastalgia   • Left leg pain   • Stress reaction of bone   • Myositis of left lower leg   • Myositis of left lower leg, unspecified myositis type       Plan:    76 y.o. female  with left soleus myositis of unclear etiology    We will send the patient to IR for muscle biopsy to be sent for pathology and tissue culture and exam and Gram stain. Based on her clinical history and presentation there is no concern clinically for infection no obvious source of any infection around the leg with no erythema and no wound and no recent systemic infection  She will be placed in a cam boot for now for pain relief  Follow-up after IR biopsy to review results      The assessment and plan were formulated by Dr. Keith Power and I assisted in carrying it out. Subjective:   Patient ID: Zach Arias is a 76 y.o. female . HPI    Patient presents to the office for follow up of left leg pain. Since her last visit, she had no relief from the Rebe[place] she feels that her pain actually worsened. She still localizes pain over the anterior mid tibia. She does note sharp pain sometimes and walking and flexing the foot up.  Here to review MRI    The following portions of the patient's history were reviewed and updated as appropriate: allergies, current medications, past family history, past social history, past surgical history and problem list.    Social History     Socioeconomic History   • Marital status: /Civil Union     Spouse name: Not on file   • Number of children: Not on file   • Years of education: Not on file   • Highest education level: Not on file   Occupational History   • Not on file   Tobacco Use   • Smoking status: Never   • Smokeless tobacco: Never   Vaping Use   • Vaping Use: Never used   Substance and Sexual Activity   • Alcohol use: Not Currently   • Drug use: Never   • Sexual activity: Not Currently     Partners: Male     Birth control/protection: Post-menopausal   Other Topics Concern   • Not on file   Social History Narrative   • Not on file     Social Determinants of Health     Financial Resource Strain: Not on file   Food Insecurity: Not on file   Transportation Needs: Not on file   Physical Activity: Not on file   Stress: Not on file   Social Connections: Not on file   Intimate Partner Violence: Not on file   Housing Stability: Not on file     Past Medical History:   Diagnosis Date   • Graves disease    • Hyperthyroidism    • Skin cancer      Past Surgical History:   Procedure Laterality Date   • TONSILLECTOMY     • US GUIDED THYROID BIOPSY  8/30/2018     Allergies   Allergen Reactions   • Furosemide Swelling   • Levofloxacin Swelling   • Penicillins Swelling     Current Outpatient Medications on File Prior to Visit   Medication Sig Dispense Refill   • clotrimazole-betamethasone (LOTRISONE) 1-0.05 % cream Apply topically 2 (two) times a day (Patient not taking: Reported on 12/16/2022) 30 g 0   • estradiol (ESTRACE) 0.1 mg/g vaginal cream Insert 2 g into the vagina daily     • ibuprofen (MOTRIN) 200 mg tablet Take by mouth     • methylPREDNISolone 4 MG tablet therapy pack Use as directed on package 1 each 0   • neomycin-polymyxin-hydrocortisone (CORTISPORIN) 1 % SOLN instill 2 drops INTO AFFECTED EAR(S) four times a day     • triamterene-hydrochlorothiazide (DYAZIDE) 37.5-25 mg per capsule Take 1 capsule by mouth daily       No current facility-administered medications on file prior to visit.        Review of Systems  See HPi    Objective: There were no vitals filed for this visit. Physical Exam    Left Ankle Exam     Tenderness   Left ankle tenderness location: mid anterior leg. Swelling: mild    Range of Motion   The patient has normal left ankle ROM. Muscle Strength   The patient has normal left ankle strength. Other   Erythema: absent  Scars: absent  Sensation: normal  Pulse: present            I have personally reviewed pertinent films in PACS and my interpretation is MRI of the left tib-fib from 10/13/2023 shows isolated edema throughout the soleus muscle without any adjacent edema within the gastrocnemius musculature. Normal signal from the tibia and fibula no evidence of fracture or cellulitis. .    Procedures  No Procedures performed today           Portions of the record may have been created with voice recognition software. Occasional wrong word or "sound a like" substitutions may have occurred due to the inherent limitations of voice recognition software. Read the chart carefully and recognize, using context, where substitutions have occurred.

## 2023-10-18 NOTE — H&P (VIEW-ONLY)
Assessment:  1. Myositis of left lower leg, unspecified myositis type  Cam Boot    Ambulatory Referral to Interventional Radiology    CANCELED: Ambulatory Referral to Interventional Radiology        Patient Active Problem List   Diagnosis   • Vaginal atrophy   • Encounter for gynecological examination (general) (routine) without abnormal findings   • Post-menopause on HRT (hormone replacement therapy)   • Inclusion cyst of vulva   • H/O hyperthyroidism   • Thyroid nodule   • Mastalgia   • Left leg pain   • Stress reaction of bone   • Myositis of left lower leg   • Myositis of left lower leg, unspecified myositis type       Plan:    76 y.o. female  with left soleus myositis of unclear etiology    We will send the patient to IR for muscle biopsy to be sent for pathology and tissue culture and exam and Gram stain. Based on her clinical history and presentation there is no concern clinically for infection no obvious source of any infection around the leg with no erythema and no wound and no recent systemic infection  She will be placed in a cam boot for now for pain relief  Follow-up after IR biopsy to review results      The assessment and plan were formulated by Dr. Emma Cole and I assisted in carrying it out. Subjective:   Patient ID: Svetlana Diaz is a 76 y.o. female . HPI    Patient presents to the office for follow up of left leg pain. Since her last visit, she had no relief from the Rebe[place]aside she feels that her pain actually worsened. She still localizes pain over the anterior mid tibia. She does note sharp pain sometimes and walking and flexing the foot up.  Here to review MRI    The following portions of the patient's history were reviewed and updated as appropriate: allergies, current medications, past family history, past social history, past surgical history and problem list.    Social History     Socioeconomic History   • Marital status: /Civil Union     Spouse name: Not on file   • Number of children: Not on file   • Years of education: Not on file   • Highest education level: Not on file   Occupational History   • Not on file   Tobacco Use   • Smoking status: Never   • Smokeless tobacco: Never   Vaping Use   • Vaping Use: Never used   Substance and Sexual Activity   • Alcohol use: Not Currently   • Drug use: Never   • Sexual activity: Not Currently     Partners: Male     Birth control/protection: Post-menopausal   Other Topics Concern   • Not on file   Social History Narrative   • Not on file     Social Determinants of Health     Financial Resource Strain: Not on file   Food Insecurity: Not on file   Transportation Needs: Not on file   Physical Activity: Not on file   Stress: Not on file   Social Connections: Not on file   Intimate Partner Violence: Not on file   Housing Stability: Not on file     Past Medical History:   Diagnosis Date   • Graves disease    • Hyperthyroidism    • Skin cancer      Past Surgical History:   Procedure Laterality Date   • TONSILLECTOMY     • US GUIDED THYROID BIOPSY  8/30/2018     Allergies   Allergen Reactions   • Furosemide Swelling   • Levofloxacin Swelling   • Penicillins Swelling     Current Outpatient Medications on File Prior to Visit   Medication Sig Dispense Refill   • clotrimazole-betamethasone (LOTRISONE) 1-0.05 % cream Apply topically 2 (two) times a day (Patient not taking: Reported on 12/16/2022) 30 g 0   • estradiol (ESTRACE) 0.1 mg/g vaginal cream Insert 2 g into the vagina daily     • ibuprofen (MOTRIN) 200 mg tablet Take by mouth     • methylPREDNISolone 4 MG tablet therapy pack Use as directed on package 1 each 0   • neomycin-polymyxin-hydrocortisone (CORTISPORIN) 1 % SOLN instill 2 drops INTO AFFECTED EAR(S) four times a day     • triamterene-hydrochlorothiazide (DYAZIDE) 37.5-25 mg per capsule Take 1 capsule by mouth daily       No current facility-administered medications on file prior to visit.        Review of Systems  See HPi    Objective: There were no vitals filed for this visit. Physical Exam    Left Ankle Exam     Tenderness   Left ankle tenderness location: mid anterior leg. Swelling: mild    Range of Motion   The patient has normal left ankle ROM. Muscle Strength   The patient has normal left ankle strength. Other   Erythema: absent  Scars: absent  Sensation: normal  Pulse: present            I have personally reviewed pertinent films in PACS and my interpretation is MRI of the left tib-fib from 10/13/2023 shows isolated edema throughout the soleus muscle without any adjacent edema within the gastrocnemius musculature. Normal signal from the tibia and fibula no evidence of fracture or cellulitis. .    Procedures  No Procedures performed today           Portions of the record may have been created with voice recognition software. Occasional wrong word or "sound a like" substitutions may have occurred due to the inherent limitations of voice recognition software. Read the chart carefully and recognize, using context, where substitutions have occurred.

## 2023-10-18 NOTE — TELEPHONE ENCOUNTER
Can let her know that basal cell is a form of skin cancer usually cured by removal as she had done. It won't affect the biopsy in any case, just a question we had to answer for IR and they will do the procedure and send the samples just the same.  These telephone notes will be part of her medical record as well for others to see too, you can make her aware

## 2023-10-18 NOTE — TELEPHONE ENCOUNTER
Called and spoke w/pt and relayed NORMA Stanton's msg to pt. Pt states that she has had several areas of Basal Cell Carcinoma removed from above neck and on thigh w/bx but was never told that they were malignant. Hope this information helps.

## 2023-10-18 NOTE — TELEPHONE ENCOUNTER
Caller: Patient    Doctor: Syd Mohan    Reason for call: Patient would like a call back regarding her appt today. Patient stated it was a miss understanding and the referral is wrong she is stating she does not have cancer. Please advise. Ty.    Call back#: [phone]

## 2023-10-18 NOTE — TELEPHONE ENCOUNTER
I tried calling the patient but could not get through to her. Can you please try calling her later on and please clarify the situation. She did describe to me a personal history of cancer of the skin. She does have skin cancer listed underneath her medical history. If this is not the case then her medical history will need to be updated and I will update the order, otherwise if she does have any history of malignancy including skin cancer then it would be best to include this on the interventional radiology referral form since they do ask that question specifically of history of malignancy.

## 2023-10-20 ENCOUNTER — PREP FOR PROCEDURE (OUTPATIENT)
Dept: RADIOLOGY | Facility: HOSPITAL | Age: 68
End: 2023-10-20

## 2023-10-20 DIAGNOSIS — M60.862 MYOSITIS OF LEFT LOWER LEG, UNSPECIFIED MYOSITIS TYPE: Primary | ICD-10-CM

## 2023-10-20 RX ORDER — SODIUM CHLORIDE 9 MG/ML
30 INJECTION, SOLUTION INTRAVENOUS CONTINUOUS
OUTPATIENT
Start: 2023-10-20

## 2023-10-24 NOTE — TELEPHONE ENCOUNTER
Please let the patient know that the biopsies the most specific way to rule out an infectious cause of the myositis.   As we discussed, we did not suspect an infection but there is no specific laboratory test or imaging study that we could do that is specific for that muscle to ensure that there is definitively no infection besides the biopsy

## 2023-10-24 NOTE — TELEPHONE ENCOUNTER
Caller: Ines Steve     Doctor: Dr Fernando CENTENO     Reason for call - The patient is calling and would like to [facility] or a nurse. She would like to know is there a less invasive procedure to detect leg muscle infection or is the biopsy the only option?      Call back#: [phone]

## 2023-10-24 NOTE — TELEPHONE ENCOUNTER
Called and spoke w/pt and relayed NORMA Stanton's msg. She went to schedule appt for bx and then wondered if there was any other test that could be done. She has no further questions/concerns at this time. Will talk w/radiologist at time of bx as she will be put under light sedation.

## 2023-10-25 NOTE — PRE-PROCEDURE INSTRUCTIONS
Pre-procedure Instructions for Interventional Radiology  [place] [address]  [place] ([place]), [address]  Stephenie Nuñez [phone]    You are scheduled for a/an Left Leg Muscle Biopsy. On Wednesday 11/1/23. Your arrival time is 0730. Our Interventional Radiology nurse will notify you a few days before your procedure with the exact arrival time and location to arrive. To prepare for your procedure:  Please arrange for someone to drive you home after the procedure and stay with you until the next morning if you are instructed to do so. This is typically for patients receiving some type of sedative or anesthetic for the procedure. DO NOT EAT OR DRINK ANYTHING after midnight on the evening before your procedure including candy & gum. ONLY SIPS OF WATER with your medications are allowed on the morning of your procedure. TAKE ALL OF YOUR REGULAR MEDICATIONS THE MORNING OF YOUR PROCEDURE with sips of water! We may call you to stop some of your blood sugar, blood pressure and blood thinning medications depending on the procedure. Please take all of these medications unless we instruct you to stop them. If you have an allergy to x-ray dye, please contact Interventional Radiology for an x-ray dye preparation which usually consists of an oral steroid and Benadryl. The day of your procedure:  Bring a list of the medications you take at home. Bring medications you take for breathing problems (such as inhalers), medications for chest pain, or both. Bring your insurance card and a form of photo ID. Bring a case for your glasses or contacts. Please leave all valuables such as credit cards and jewelry at home. Report to the registration desk in the main lobby at the [facility]. Ask to be directed to the After Procedure Unit on the 2nd floor. While your procedure is being performed your family may wait in the Waiting Room on the 2nd floor.   Be prepared to stay overnight just in case. Sometimes procedures will indicate the need for further observation or treatment. If you are scheduled for a follow-up visit with the Interventional Radiologist after your procedure, you will be called with a date and time.     Special Instructions (Medications to alter or stop taking before your procedure etc.):

## 2023-11-01 ENCOUNTER — HOSPITAL ENCOUNTER (OUTPATIENT)
Dept: CT IMAGING | Facility: HOSPITAL | Age: 68
Discharge: HOME | End: 2023-11-01
Attending: RADIOLOGY
Payer: MEDICARE

## 2023-11-01 VITALS
TEMPERATURE: 97.6 F | HEART RATE: 62 BPM | SYSTOLIC BLOOD PRESSURE: 161 MMHG | WEIGHT: 156 LBS | DIASTOLIC BLOOD PRESSURE: 78 MMHG | HEIGHT: 62 IN | BODY MASS INDEX: 28.71 KG/M2 | OXYGEN SATURATION: 94 % | RESPIRATION RATE: 14 BRPM

## 2023-11-01 DIAGNOSIS — M60.862 MYOSITIS OF LEFT LOWER LEG, UNSPECIFIED MYOSITIS TYPE: ICD-10-CM

## 2023-11-01 LAB
ANION GAP SERPL CALCULATED.3IONS-SCNC: 8 MMOL/L
BASOPHILS # BLD AUTO: 0.04 THOUSANDS/ÂΜL (ref 0–0.1)
BASOPHILS NFR BLD AUTO: 1 % (ref 0–1)
BUN SERPL-MCNC: 18 MG/DL (ref 5–25)
CALCIUM SERPL-MCNC: 10.3 MG/DL (ref 8.4–10.2)
CHLORIDE SERPL-SCNC: 102 MMOL/L (ref 96–108)
CO2 SERPL-SCNC: 27 MMOL/L (ref 21–32)
CREAT SERPL-MCNC: 0.7 MG/DL (ref 0.6–1.3)
EOSINOPHIL # BLD AUTO: 0.05 THOUSAND/ÂΜL (ref 0–0.61)
EOSINOPHIL NFR BLD AUTO: 1 % (ref 0–6)
ERYTHROCYTE [DISTWIDTH] IN BLOOD BY AUTOMATED COUNT: 13.7 % (ref 11.6–15.1)
GFR SERPL CREATININE-BSD FRML MDRD: 89 ML/MIN/1.73SQ M
GLUCOSE P FAST SERPL-MCNC: 110 MG/DL (ref 65–99)
GLUCOSE SERPL-MCNC: 110 MG/DL (ref 65–140)
HCT VFR BLD AUTO: 46.8 % (ref 34.8–46.1)
HGB BLD-MCNC: 15.2 G/DL (ref 11.5–15.4)
IMM GRANULOCYTES # BLD AUTO: 0.01 THOUSAND/UL (ref 0–0.2)
IMM GRANULOCYTES NFR BLD AUTO: 0 % (ref 0–2)
INR PPP: 1.01 (ref 0.84–1.19)
LYMPHOCYTES # BLD AUTO: 1.72 THOUSANDS/ÂΜL (ref 0.6–4.47)
LYMPHOCYTES NFR BLD AUTO: 34 % (ref 14–44)
MCH RBC QN AUTO: 30.1 PG (ref 26.8–34.3)
MCHC RBC AUTO-ENTMCNC: 32.5 G/DL (ref 31.4–37.4)
MCV RBC AUTO: 93 FL (ref 82–98)
MONOCYTES # BLD AUTO: 0.34 THOUSAND/ÂΜL (ref 0.17–1.22)
MONOCYTES NFR BLD AUTO: 7 % (ref 4–12)
NEUTROPHILS # BLD AUTO: 2.95 THOUSANDS/ÂΜL (ref 1.85–7.62)
NEUTS SEG NFR BLD AUTO: 57 % (ref 43–75)
NRBC BLD AUTO-RTO: 0 /100 WBCS
PLATELET # BLD AUTO: 240 THOUSANDS/UL (ref 149–390)
PMV BLD AUTO: 8.9 FL (ref 8.9–12.7)
POTASSIUM SERPL-SCNC: 3.5 MMOL/L (ref 3.5–5.3)
PROTHROMBIN TIME: 13.2 SECONDS (ref 11.6–14.5)
RBC # BLD AUTO: 5.05 MILLION/UL (ref 3.81–5.12)
SODIUM SERPL-SCNC: 137 MMOL/L (ref 135–147)
WBC # BLD AUTO: 5.11 THOUSAND/UL (ref 4.31–10.16)

## 2023-11-01 PROCEDURE — 20206 BIOPSY MUSCLE PERQ NEEDLE: CPT

## 2023-11-01 PROCEDURE — 76942 ECHO GUIDE FOR BIOPSY: CPT

## 2023-11-01 PROCEDURE — 87102 FUNGUS ISOLATION CULTURE: CPT | Performed by: ORTHOPAEDIC SURGERY

## 2023-11-01 PROCEDURE — 87075 CULTR BACTERIA EXCEPT BLOOD: CPT | Performed by: ORTHOPAEDIC SURGERY

## 2023-11-01 PROCEDURE — 88305 TISSUE EXAM BY PATHOLOGIST: CPT | Performed by: PATHOLOGY

## 2023-11-01 PROCEDURE — 87070 CULTURE OTHR SPECIMN AEROBIC: CPT | Performed by: ORTHOPAEDIC SURGERY

## 2023-11-01 PROCEDURE — 85025 COMPLETE CBC W/AUTO DIFF WBC: CPT | Performed by: RADIOLOGY

## 2023-11-01 PROCEDURE — 85610 PROTHROMBIN TIME: CPT | Performed by: RADIOLOGY

## 2023-11-01 PROCEDURE — 77012 CT SCAN FOR NEEDLE BIOPSY: CPT

## 2023-11-01 PROCEDURE — 87205 SMEAR GRAM STAIN: CPT | Performed by: ORTHOPAEDIC SURGERY

## 2023-11-01 PROCEDURE — 80048 BASIC METABOLIC PNL TOTAL CA: CPT | Performed by: RADIOLOGY

## 2023-11-01 RX ORDER — ACETAMINOPHEN 325 MG/1
650 TABLET ORAL EVERY 6 HOURS PRN
Status: DISCONTINUED | OUTPATIENT
Start: 2023-11-01 | End: 2023-11-05 | Stop reason: HOSPADM

## 2023-11-01 RX ORDER — LIDOCAINE HYDROCHLORIDE 10 MG/ML
INJECTION, SOLUTION EPIDURAL; INFILTRATION; INTRACAUDAL; PERINEURAL AS NEEDED
Status: COMPLETED | OUTPATIENT
Start: 2023-11-01 | End: 2023-11-01

## 2023-11-01 RX ORDER — MIDAZOLAM HYDROCHLORIDE 2 MG/2ML
INJECTION, SOLUTION INTRAMUSCULAR; INTRAVENOUS AS NEEDED
Status: COMPLETED | OUTPATIENT
Start: 2023-11-01 | End: 2023-11-01

## 2023-11-01 RX ORDER — FENTANYL CITRATE 50 UG/ML
INJECTION, SOLUTION INTRAMUSCULAR; INTRAVENOUS AS NEEDED
Status: COMPLETED | OUTPATIENT
Start: 2023-11-01 | End: 2023-11-01

## 2023-11-01 RX ORDER — SODIUM CHLORIDE 9 MG/ML
30 INJECTION, SOLUTION INTRAVENOUS CONTINUOUS
Status: DISCONTINUED | OUTPATIENT
Start: 2023-11-01 | End: 2023-11-05 | Stop reason: HOSPADM

## 2023-11-01 RX ORDER — ONDANSETRON 2 MG/ML
INJECTION INTRAMUSCULAR; INTRAVENOUS AS NEEDED
Status: COMPLETED | OUTPATIENT
Start: 2023-11-01 | End: 2023-11-01

## 2023-11-01 RX ORDER — ONDANSETRON 2 MG/ML
4 INJECTION INTRAMUSCULAR; INTRAVENOUS ONCE
Status: COMPLETED | OUTPATIENT
Start: 2023-11-01 | End: 2023-11-01

## 2023-11-01 RX ADMIN — ACETAMINOPHEN 650 MG: 325 TABLET, FILM COATED ORAL at 12:01

## 2023-11-01 RX ADMIN — LIDOCAINE HYDROCHLORIDE 10 ML: 10 INJECTION, SOLUTION EPIDURAL; INFILTRATION; INTRACAUDAL at 09:40

## 2023-11-01 RX ADMIN — ONDANSETRON 4 MG: 2 INJECTION INTRAMUSCULAR; INTRAVENOUS at 10:05

## 2023-11-01 RX ADMIN — FENTANYL CITRATE 50 MCG: 50 INJECTION, SOLUTION INTRAMUSCULAR; INTRAVENOUS at 09:17

## 2023-11-01 RX ADMIN — MIDAZOLAM HYDROCHLORIDE 1 MG: 1 INJECTION, SOLUTION INTRAMUSCULAR; INTRAVENOUS at 09:17

## 2023-11-01 RX ADMIN — SODIUM CHLORIDE 30 ML/HR: 0.9 INJECTION, SOLUTION INTRAVENOUS at 09:00

## 2023-11-01 RX ADMIN — MIDAZOLAM HYDROCHLORIDE 1 MG: 1 INJECTION, SOLUTION INTRAMUSCULAR; INTRAVENOUS at 09:30

## 2023-11-01 RX ADMIN — FENTANYL CITRATE 50 MCG: 50 INJECTION, SOLUTION INTRAMUSCULAR; INTRAVENOUS at 09:30

## 2023-11-01 RX ADMIN — ONDANSETRON 4 MG: 2 INJECTION INTRAMUSCULAR; INTRAVENOUS at 12:55

## 2023-11-01 NOTE — BRIEF OP NOTE (RAD/CATH)
INTERVENTIONAL RADIOLOGY PROCEDURE NOTE    Date: 11/1/2023    Procedure:   Procedure Summary       Date: 11/01/23 Room / Location: [address] [place] [address] CT Scan    Anesthesia Start:  Anesthesia Stop:     Procedure: IR BIOPSY OTHER Diagnosis:       Myositis of left lower leg, unspecified myositis type      (muscle abnormal on MRI, concern for infection inflammation)    Scheduled Providers: Joaquin Montgomery MD Responsible Provider:     Anesthesia Type: Not recorded ASA Status: Not recorded            Preoperative diagnosis:   1. Myositis of left lower leg, unspecified myositis type         Postoperative diagnosis: Same. Surgeon: Joaquin Montgomery MD     Assistant: None. No qualified resident was available. Blood loss: 0    Specimens:     Cultures  AEROBIC ANAEROBIC   AFB FUNGAL      20g cores in formalin x3    Findings:     Biopsy of the left soleus muscle in the area of inflammation on MRI    Left calf medial approach    Gelfoam tract hemostasis      Complications: None immediate.     Anesthesia: conscious sedation

## 2023-11-01 NOTE — QUICK NOTE
Patient with nausea at the end of the procedure given Zofran    She then developed a headache in the holding area, given Tylenol    I visited her and examined her, she had been sipping water and had ambulated to the bathroom. She  denied history of migraines but states that she does get the sorts of headaches from time to time and was feeling better with less nausea    We offered more Zofran.   She did feel safe for discharge    Her spouse was at bedside    Left extremity calf was soft, she described pain as chronic with no procedural related pain, was moving toes with normal sensation    Okay for discharge

## 2023-11-01 NOTE — INTERVAL H&P NOTE
Update: (This section must be completed if the H&P was completed greater than 24 hrs to procedure or admission)    H&P reviewed. After examining the patient, I find no changed to the H&P since it had been written. 68F w/ anterior L calf pain - near bone    She has been seen and evaluated by the orthopedic surgery with concern for inflammation versus infection    MRI was obtained which I personally reviewed. There is inflammation in the soleus muscle    On exam the patient has no tenderness posteriorly. She points to a bony area of the tibia as the site of pain. I discussed that for diagnosis of various inflammatory conditions muscle biopsy of a strip of muscle with enough sarcomeres is generally required. I cannot do this with a needle. However we can get a small amount of tissue for infection work-up and possible pathology    I discussed the risks including bleeding damage to blood vessels, nerves resulting in paralysis or foot drop. We will use local lidocaine and sedation. She wishes to proceed. Overall, I counseled her that this may be a low yield biopsy but is less risky than surgery at this time    No prior infection inflammation or injury to this area     Area of pain marked with a skin marker    Mp2 asa2      Patient re-evaluated.  Accept as history and physical.    Seema Stinson MD/November 1, 2023/9:06 AM

## 2023-11-01 NOTE — DISCHARGE INSTRUCTIONS
POST BIOPSY    Care after your procedure:    1. Limit your activities for 24 hours after your biopsy. 2. No driving day of biopsy. 3. Return to your normal diet. Small sips of flat soda will help with mild nausea. 4. Remove band-aid or dressing 24 hours after procedure. Contact Interventional Radiology at [phone] [facility] PATIENTS: Contact Interventional Radiology at [phone]) Kassie Baig PATIENTS: Contact Interventional Radiology at [phone]) if:    1. Difficulty breathing, nausea or vomiting. 2. Chills or fever above 101 degrees F.     3. Pain at biopsy site not relieved by medication. 4. Develop any redness, swelling, heat, unusual drainage, heavy bruising or bleeding from biopsy site. Moderate Sedation   WHAT YOU NEED TO KNOW:   Moderate sedation, or conscious sedation, is medicine used during procedures to help you feel relaxed and calm. You will be awake and able to follow directions without anxiety or pain. You will remember little to none of the procedure. You may feel tired, weak, or unsteady on your feet after you get sedation. You may also have trouble concentrating or short-term memory loss. These symptoms should go away in 24 hours or less. DISCHARGE INSTRUCTIONS:   Call 911 or have someone else call for any of the following: You have sudden trouble breathing. You cannot be woken. Seek care immediately if:   You have a severe headache or dizziness. Your heart is beating faster than usual.  Contact your healthcare provider if:   You have a fever. You have nausea or are vomiting for more than 8 hours after the procedure. Your skin is itchy, swollen, or you have a rash. You have questions or concerns about your condition or care. Self-care:   Have someone stay with you for 24 hours. This person can drive you to errands and help you do things around the house. This person can also watch for problems.       Rest and do quiet activities for 24 hours. Do not exercise, ride a bike, or play sports. Stand up slowly to prevent dizziness and falls. Take short walks around the house with another person. Slowly return to your usual activities the next day. Do not drive or use dangerous machines or tools for 24 hours. You may injure yourself or others. Examples include a lawnmower, saw, or drill. Do not return to work for 24 hours if you use dangerous machines or tools for work. Do not make important decisions for 24 hours. For example, do not sign important papers or invest money. Drink liquids as directed. Liquids help flush the sedation medicine out of your body. Ask how much liquid to drink each day and which liquids are best for you. Eat small, frequent meals to prevent nausea and vomiting. Start with clear liquids such as juice or broth. If you do not vomit after clear liquids, you can eat your usual foods. Do not drink alcohol or take medicines that make you drowsy. This includes medicines that help you sleep and anxiety medicines. Ask your healthcare provider if it is safe for you to take pain medicine. Follow up with your healthcare provider as directed: Write down your questions so you remember to ask them during your visits. © 2017 5 [facility] Information is for End User's use only and may not be sold, redistributed or otherwise used for commercial purposes. All illustrations and images included in CareNotes® are the copyrighted property of [employer].D.A[employer] Inc. or Leopoldo Cavazos. The above information is an  only. It is not intended as medical advice for individual conditions or treatments. Talk to your doctor, nurse or pharmacist before following any medical regimen to see if it is safe and effective for you.

## 2023-11-01 NOTE — SEDATION DOCUMENTATION
CT/US guided biopsy completed in IR by Dr Porter Flores. Bedrest start time 1000. Post procedure nausea reportedly improving after Zofran per patient.

## 2023-11-02 ENCOUNTER — TELEPHONE (OUTPATIENT)
Age: 68
End: 2023-11-02

## 2023-11-02 PROCEDURE — 88305 TISSUE EXAM BY PATHOLOGIST: CPT | Performed by: PATHOLOGY

## 2023-11-02 NOTE — TELEPHONE ENCOUNTER
Called and spoke with pt and relayed Dr Shady Saeed, and further advised no one had reached out as not all results are in. Pt just found out today what her results were as well. No further questions at this time.

## 2023-11-02 NOTE — TELEPHONE ENCOUNTER
Caller: patient    Doctor: Landon Duffy    Reason for call: pt is calling for the results of her biopsy that was performed on 11/1/23.   She stated she can see the result in her [employer]    Call back#: [phone]

## 2023-11-02 NOTE — TELEPHONE ENCOUNTER
Would you like me to call the pt and explain so far results are negative but not all results are back? Thank you.

## 2023-11-04 LAB
BACTERIA SPEC ANAEROBE CULT: NO GROWTH
BACTERIA SPEC BFLD CULT: NO GROWTH
GRAM STN SPEC: NORMAL

## 2023-11-06 LAB — FUNGUS SPEC CULT: NORMAL

## 2023-11-09 ENCOUNTER — OFFICE VISIT (OUTPATIENT)
Dept: RHEUMATOLOGY | Facility: CLINIC | Age: 68
End: 2023-11-09
Payer: MEDICARE

## 2023-11-09 ENCOUNTER — APPOINTMENT (OUTPATIENT)
Dept: LAB | Facility: CLINIC | Age: 68
End: 2023-11-09
Payer: MEDICARE

## 2023-11-09 VITALS
WEIGHT: 155 LBS | HEIGHT: 62 IN | SYSTOLIC BLOOD PRESSURE: 142 MMHG | HEART RATE: 84 BPM | OXYGEN SATURATION: 98 % | DIASTOLIC BLOOD PRESSURE: 78 MMHG | BODY MASS INDEX: 28.52 KG/M2

## 2023-11-09 DIAGNOSIS — G90.522 COMPLEX REGIONAL PAIN SYNDROME I OF LEFT LOWER LIMB: ICD-10-CM

## 2023-11-09 DIAGNOSIS — G90.522 COMPLEX REGIONAL PAIN SYNDROME I OF LEFT LOWER LIMB: Primary | ICD-10-CM

## 2023-11-09 PROCEDURE — 86430 RHEUMATOID FACTOR TEST QUAL: CPT

## 2023-11-09 PROCEDURE — 99204 OFFICE O/P NEW MOD 45 MIN: CPT | Performed by: INTERNAL MEDICINE

## 2023-11-09 PROCEDURE — 36415 COLL VENOUS BLD VENIPUNCTURE: CPT

## 2023-11-09 PROCEDURE — 86200 CCP ANTIBODY: CPT

## 2023-11-09 NOTE — PROGRESS NOTES
This is a Rheumatology Consult seen at the request of Dr. Jeff Escobar      HPI: Elvira Anglin is a 77 y/o female who presents for further evaluation left soleus muscle pain. She has past medical history Graves disease, hyperthyroidism (in remission), thyroid nodule, basal cell CA. Does not recall starting any new medications, supplements. However back in July she recalls climbing up and down tractor several times for a few weeks. Onset of symptoms September with LLE pain     Pain described anterior LLE extremity with swelling. MRI LLE with soleus edema ?strain vs myositis. IR biopsy negative for inflammation or malignancy.  She has had trial medrol hao per Ortho which did not help     Denies pain, swelling or stiffness hands, elbows, shoulders    No significant symptoms RLE    Denies rashes, Raynaud's, seizures or strokes, thrombotic events, pregnancy morbidity    + dry mouth               --------------------------------------------------------------------------------------------------------        ROS:        All other ROS was reviewed and negative except as above         --------------------------------------------------------------------------------------------------------    Past Medical History    Past Medical History:   Diagnosis Date    Graves disease     Hyperthyroidism     Skin cancer            Past Surgical History    Past Surgical History:   Procedure Laterality Date    IR BIOPSY OTHER  11/1/2023    TONSILLECTOMY      US GUIDED THYROID BIOPSY  8/30/2018           Family History    Family History   Problem Relation Age of Onset    Cancer Mother     Alzheimer's disease Father     Hypertension Father     Skin cancer Father     Skin cancer Brother     Cancer Paternal Grandmother     Skin cancer Paternal Aunt     Breast cancer Neg Hx     Ovarian cancer Neg Hx     Colon cancer Neg Hx             Social History    Social History     Tobacco Use    Smoking status: Never    Smokeless tobacco: Never Vaping Use    Vaping Use: Never used   Substance Use Topics    Alcohol use: Not Currently     Comment: Social    Drug use: Never     Part time bookkeeping      Allergies    Allergies   Allergen Reactions    Furosemide Swelling    Levofloxacin Swelling    Penicillins Swelling         Medications    Current Outpatient Medications   Medication Instructions    clotrimazole-betamethasone (LOTRISONE) 1-0.05 % cream Topical, 2 times daily    estradiol (ESTRACE) 2 g, Vaginal, Daily    Estradiol-Estriol-Progesterone (BIEST/PROGESTERONE TD) Topical    ibuprofen (MOTRIN) 200 mg tablet Oral    methylPREDNISolone 4 MG tablet therapy pack Use as directed on package    neomycin-polymyxin-hydrocortisone (CORTISPORIN) 1 % SOLN instill 2 drops INTO AFFECTED EAR(S) four times a day    PROGESTERONE  mg, Oral, Daily    triamterene-hydrochlorothiazide (DYAZIDE) 37.5-25 mg per capsule 1 capsule, Oral, Daily          Physical Exam    /78   Pulse 84   Ht 5' 2" (1.575 m)   Wt 70.3 kg (155 lb)   SpO2 98%   BMI 28.35 kg/m²     GEN: AAO, No apparent distress. Patient is well developed. HEENT:  Pupils are equal, round and reactive. Sclera are clear. Fundoscopic exam is normal.  External ears are without lesions. Oral pharynx is clear of ulcers or other lesions. MMM. NECK:  Supple. There is no adenopathy appreciable in anterior or posterior cervical chains or supraclavicularly. JVP is normal.    HEART: Regular rate and rhythm. There is no appreciable murmur, gallop or rub. LUNGS: Clear to auscultation. ABD:  Soft, without tenderness, rebound or guarding. No appreciable organomegally. NEURO: Speech and cognition are normal.  Strength is 5/5 throughout. Tone is normal.  DTRs are 2/4 at the knees, ankles and elbows.   Gait is normal.  SKIN: There are no rashes or lesions    MUSCULOSKELETAL:   No synovitis noted      ________________________________________________________________________          Results Review    IMPRESSION:     1. No stress fracture or periostitis  2. Edema within the soleus, more so laterally, while this could represent a strain, it is atypical that the adjacent gastrocnemius is within normal limits. Plantaris tear is also a differential consideration, however this too would be expected to   coincide with gastrocnemius edema. Consider infectious/inflammatory myositis. Impressions and Plans:    Leonel Davies is a 77 y/o female with edema of left soleous muscle of unknown etiology. She has had MRI LLE and also IR biopsy without any evidence of inflammatory myopathy on biopsy reports  Also had trial of medrol  hao which did not help (would expect a more robust response with inflammatory myopathies)   Check RF, CCP  ? complex regional pain syndrome: would recommend evaluation per pain medicine and/or neurology  Continue NSAIDs/Tylenol as needed  Will review test results and f/u as needed    Thank you for involving me in this patient's care.         Cristel Barber MD  Department of Rheumatology  [address] [facility]

## 2023-11-10 LAB — RHEUMATOID FACT SER QL LA: NEGATIVE

## 2023-11-13 ENCOUNTER — HOSPITAL ENCOUNTER (OUTPATIENT)
Dept: RADIOLOGY | Facility: IMAGING CENTER | Age: 68
Discharge: HOME | End: 2023-11-13
Payer: MEDICARE

## 2023-11-13 VITALS — HEIGHT: 62 IN | BODY MASS INDEX: 28.52 KG/M2 | WEIGHT: 155 LBS

## 2023-11-13 DIAGNOSIS — Z12.31 ENCOUNTER FOR SCREENING MAMMOGRAM FOR BREAST CANCER: ICD-10-CM

## 2023-11-13 PROCEDURE — 77067 SCR MAMMO BI INCL CAD: CPT

## 2023-11-13 PROCEDURE — 77063 BREAST TOMOSYNTHESIS BI: CPT

## 2023-11-14 LAB — CCP AB SER IA-ACNC: 0.9

## 2023-11-20 LAB — FUNGUS SPEC CULT: NORMAL

## 2023-11-27 ENCOUNTER — TELEPHONE (OUTPATIENT)
Dept: NEUROLOGY | Facility: CLINIC | Age: 68
End: 2023-11-27

## 2023-11-27 LAB — FUNGUS SPEC CULT: NORMAL

## 2023-12-04 LAB — FUNGUS SPEC CULT: NORMAL

## 2023-12-07 ENCOUNTER — CONSULT (OUTPATIENT)
Dept: NEUROLOGY | Facility: CLINIC | Age: 68
End: 2023-12-07
Payer: MEDICARE

## 2023-12-07 VITALS
OXYGEN SATURATION: 100 % | BODY MASS INDEX: 29.63 KG/M2 | TEMPERATURE: 97.2 F | HEART RATE: 72 BPM | RESPIRATION RATE: 16 BRPM | SYSTOLIC BLOOD PRESSURE: 167 MMHG | DIASTOLIC BLOOD PRESSURE: 82 MMHG | WEIGHT: 161 LBS | HEIGHT: 62 IN

## 2023-12-07 DIAGNOSIS — G90.522 COMPLEX REGIONAL PAIN SYNDROME I OF LEFT LOWER LIMB: ICD-10-CM

## 2023-12-07 DIAGNOSIS — M79.89 LEG SWELLING: ICD-10-CM

## 2023-12-07 DIAGNOSIS — M62.89 EDEMA OF MUSCLE DETERMINED BY MAGNETIC RESONANCE IMAGING: Primary | ICD-10-CM

## 2023-12-07 DIAGNOSIS — M79.605 LEFT LEG PAIN: ICD-10-CM

## 2023-12-07 PROCEDURE — 99205 OFFICE O/P NEW HI 60 MIN: CPT | Performed by: PSYCHIATRY & NEUROLOGY

## 2023-12-07 NOTE — ASSESSMENT & PLAN NOTE
She has very discrete pain in the left anterior leg along the tibia. That same area is numb. No preceding trauma. 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 MRI showed edema in the soleus muscle suggestive of inflammatory or infectious process. CT-guided muscle biopsy done by interventional radiology showed scattered muscle fibers with fibroadipose tissue. No suggestion of inflammatory cells or malignancy. Unclear etiology. It would be helpful to see if other tibial innervated muscles are denervated.     Recommendations:  -EMG of the left leg  -I will ask our  to contact the patient for an appointment on 12/21/2023  -Check CPK regarding the possibility of focal myositis although this seems unlikely  -Check triple phase bone scan as the possibility of complex regional pain syndrome has been raised

## 2023-12-07 NOTE — PROGRESS NOTES
Patient ID: Denis Yee is a 76 y.o. female. Assessment/Plan:    Left leg pain  She has very discrete pain in the left anterior leg along the tibia. That same area is numb. No preceding trauma. 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 MRI showed edema in the soleus muscle suggestive of inflammatory or infectious process. CT-guided muscle biopsy done by interventional radiology showed scattered muscle fibers with fibroadipose tissue. No suggestion of inflammatory cells or malignancy. Unclear etiology. It would be helpful to see if other tibial innervated muscles are denervated. Recommendations:  -EMG of the left leg  -I will ask our  to contact the patient for an appointment on 12/21/2023  -Check CPK regarding the possibility of focal myositis although this seems unlikely  -Check triple phase bone scan as the possibility of complex regional pain syndrome has been raised    Leg swelling  She has persistent left leg swelling. She has been using a boot to reduce the swelling. Advised that she can use a compression sock instead. She was also concerned that she should not be walking with the boot. There is no fracture of the left tibia. Therefore, I do not think it is imperative for her to use the boot. She can certainly discuss with Ortho, but she told me that they said to use it as needed as well. Follow-up after EMG left leg and triple phase bone scan or sooner if difficulties. I have spent a total time of 65 minutes on 12/07/2023 in caring for this patient including Diagnostic results, Instructions for management, Impressions, Counseling / Coordination of care, Documenting in the medical record, Reviewing / ordering tests, medicine, procedures  , Obtaining or reviewing history  , and Communicating with other healthcare professionals .      Subjective:    Mrs Dana Shah is a 76year old lady with PMH hyperthyroidism/ Graves (no longer on meds), myositis presents for new neuromuscular evaluation of left leg pain. 3 months ago, she was walking in the field, watering flowers. All of a sdden she felt like she was kicked in the shin. Within 3 days, they leg became swollen. She saw PCP who ordered X-rays. She then saw ortho, who ordered MRI. MRI L tibia showed edema in the soleus muscle. Biopsy showed fibroadipose tissue and scattered muscle fiber. Unfortunately, the biopsy does not comment on the integrity of the muscle fibers. She tried prednisone with minimal releif. The pressure of a boot helps. There is constant pain. When she walks with the boot, she has pain 4/10. If she walks too much, it goes up to 6/10 and she needs ibuprofen. She has numbness of the anterior shin. No weakness. No change in sweating. No color changes. No allodynia. Some positions may provoke the pain. Pain does not wake her. If she lets the left hang, it aches. She has changed her diet in the recent years when she developed right hip pain. She has eliminated processed foods and sugar. She follows with a wellness doctor. She initiated this regimen due to R hip pain. The change in diet and eliminating carbs was very helpful for the right hip. 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 She was doing a lot of this about 3 to 4 weeks before her symptoms started, but this is her norm. Objective:    Blood pressure 167/82, pulse 72, temperature (!) 97.2 °F (36.2 °C), temperature source Temporal, resp. rate 16, height 5' 2" (1.575 m), weight 73 kg (161 lb), SpO2 100 %. Physical Exam  Constitutional:       Appearance: Normal appearance. HENT:      Mouth/Throat:      Mouth: Mucous membranes are moist.      Pharynx: Oropharynx is clear. Eyes:      Conjunctiva/sclera: Conjunctivae normal.   Neck:      Vascular: No carotid bruit. Cardiovascular:      Rate and Rhythm: Normal rate and regular rhythm. Pulmonary:      Effort: Pulmonary effort is normal.      Breath sounds: Normal breath sounds. Musculoskeletal:      Left lower leg: Edema present. Psychiatric:         Mood and Affect: Mood normal.         Behavior: Behavior normal.      Comments: Anxious, briefly tearful         Neurological Exam  Mental status: Awake, alert, oriented to person place and time. Naming, knowledge, repetition, concentration and recall intact. Cranial nerves: Extraocular movements intact. Pupils equally round and reactive to light. Visual fields full to confrontation. Facial sensation intact. Face symmetric. Speech clear. Hearing intact. Tongue, uvula, palate midline and intact. Sternocleidomastoid intact. Motor:   Deltoid: Right 5/5, left 5/5  Biceps: Right 5/5, left 5/5  Triceps: Right 5/5, left 5/5  : Right 5/5, left 5/5    Iliopsoas: Right 5/5, left 5/5  Quadriceps: Right 5/5, left 5/5  Tibialis anterior: Right 5/5, left 5/5  Medial gastrocnemius: Right 5/5, left 5/5  Peroneus longus: Right 5/5, left 5/5  Tibialis posterior: Right 5/5, left 5/5 (she did have pain on activating on the left side)  Extensor hallucis longus: Right 5/5, left 5/5  Abductor hallucis: Right 5/5, left 5-/5    Cerebellar: Slight dysmetria, improves with repetition. She walks with a boot on the left foot. Reflexes:   Biceps: Right 2+, left 2+  Triceps: Right 2+, left 2+  Brachioradialis: Right 2+, left 2+  Patellar: Right 2+, left 2+  Achilles: Right 2+, left 2+  Plantar responses flexor bilaterally. Sensory: Light touch intact. Temperature reduced in the left medial leg and foot.,  And over the middle of the shin. Vibration 13 seconds on the right and 8 seconds at the left great toe. ROS:  Review of Systems  Constitutional:  Negative for appetite change, fatigue and fever. HENT: Negative.   Negative for hearing loss, tinnitus, trouble swallowing and voice change. Eyes: Negative. Negative for photophobia, pain and visual disturbance. Respiratory: Negative. Negative for shortness of breath. Cardiovascular: Negative. Negative for palpitations. Gastrointestinal: Negative. Negative for nausea and vomiting. Endocrine: Negative. Negative for cold intolerance. Genitourinary: Negative. Negative for dysuria, frequency and urgency. Musculoskeletal:  Negative for back pain, gait problem, myalgias and neck pain. L leg swollen and painful   Skin: Negative. Negative for rash. Allergic/Immunologic: Negative. Neurological:  Positive for numbness (L leg). Negative for dizziness, tremors, seizures, syncope, facial asymmetry, speech difficulty, weakness, light-headedness and headaches. Hematological: Negative. Does not bruise/bleed easily. Psychiatric/Behavioral: Negative. Negative for confusion, hallucinations and sleep disturbance. DATA:  11/1/23: Final Diagnosis   A. Muscle, "left leg," Biopsy:  - Scant fragments of skeletal muscle and fibroadipose tissue  - Negative for inflammation or malignancy        MRI L tibia 10/13/23:  1. No stress fracture or periostitis  2. Edema within the soleus, more so laterally, while this could represent a strain, it is atypical that the adjacent gastrocnemius is within normal limits. Plantaris tear is also a differential consideration, however this too would be expected to   coincide with gastrocnemius edema. Consider infectious/inflammatory myositis    11/9/23:  CCP 0.9  RF negative    11/1/23:  Hgb 15.2  MCV 93  Platelets 940  BUN 18  Cr 0.7    5/5/23:  TSH 1.13      X-ray LEFT TIBIA AND FIBULA, 9/21/2023:  There is no acute fracture or dislocation. Mild narrowing of the lateral compartment of the knee. Mild degenerative change of the tibiofibular joint. Soft tissues are unremarkable. IMPRESSION:     No acute osseous abnormality.      Mild degenerative changes of the knee. Detail Level: Simple Detail Level: Generalized

## 2023-12-07 NOTE — PROGRESS NOTES
Review of Systems   Constitutional:  Negative for appetite change, fatigue and fever. HENT: Negative. Negative for hearing loss, tinnitus, trouble swallowing and voice change. Eyes: Negative. Negative for photophobia, pain and visual disturbance. Respiratory: Negative. Negative for shortness of breath. Cardiovascular: Negative. Negative for palpitations. Gastrointestinal: Negative. Negative for nausea and vomiting. Endocrine: Negative. Negative for cold intolerance. Genitourinary: Negative. Negative for dysuria, frequency and urgency. Musculoskeletal:  Negative for back pain, gait problem, myalgias and neck pain. L leg swollen and painful   Skin: Negative. Negative for rash. Allergic/Immunologic: Negative. Neurological:  Positive for numbness (L leg). Negative for dizziness, tremors, seizures, syncope, facial asymmetry, speech difficulty, weakness, light-headedness and headaches. Hematological: Negative. Does not bruise/bleed easily. Psychiatric/Behavioral: Negative. Negative for confusion, hallucinations and sleep disturbance.

## 2023-12-07 NOTE — ASSESSMENT & PLAN NOTE
Discharge instructions given to pt. Pt verbalized understanding. RX in hand. She has persistent left leg swelling. She has been using a boot to reduce the swelling. Advised that she can use a compression sock instead. She was also concerned that she should not be walking with the boot. There is no fracture of the left tibia. Therefore, I do not think it is imperative for her to use the boot. She can certainly discuss with Ortho, but she told me that they said to use it as needed as well.

## 2023-12-19 ENCOUNTER — TELEPHONE (OUTPATIENT)
Dept: NEUROLOGY | Facility: CLINIC | Age: 68
End: 2023-12-19

## 2023-12-19 NOTE — TELEPHONE ENCOUNTER
I see an opening for tomorrow. Can she do tomorrow?    I am so sorry! I meant to message you after I saw her because there were openings on 12/21 when I saw her a few weeks ago.

## 2023-12-19 NOTE — TELEPHONE ENCOUNTER
Patient called stated that she is suppose to have an EMG scheduled with Dr Nicole on 12/21, did not see the appointment on [facility].    Per Dr Nicole:     Return for After EMG, and bone scan. EMG will most likely be on 12/21.  will call you.     Please call patient to coordinate. Thank you!

## 2023-12-20 ENCOUNTER — HOSPITAL ENCOUNTER (OUTPATIENT)
Dept: NEUROLOGY | Facility: CLINIC | Age: 68
Discharge: HOME | End: 2023-12-20
Payer: MEDICARE

## 2023-12-20 DIAGNOSIS — M79.605 LEFT LEG PAIN: ICD-10-CM

## 2023-12-20 DIAGNOSIS — M62.89 EDEMA OF MUSCLE DETERMINED BY MAGNETIC RESONANCE IMAGING: ICD-10-CM

## 2023-12-20 PROCEDURE — 95909 NRV CNDJ TST 5-6 STUDIES: CPT | Performed by: PSYCHIATRY & NEUROLOGY

## 2023-12-20 PROCEDURE — 95886 MUSC TEST DONE W/N TEST COMP: CPT | Performed by: PSYCHIATRY & NEUROLOGY

## 2023-12-28 ENCOUNTER — HOSPITAL ENCOUNTER (OUTPATIENT)
Dept: NUCLEAR MEDICINE | Facility: HOSPITAL | Age: 68
Discharge: HOME | End: 2023-12-28
Attending: PSYCHIATRY & NEUROLOGY
Payer: MEDICARE

## 2023-12-28 ENCOUNTER — TELEPHONE (OUTPATIENT)
Dept: NEUROLOGY | Facility: CLINIC | Age: 68
End: 2023-12-28

## 2023-12-28 DIAGNOSIS — M62.89 EDEMA OF MUSCLE DETERMINED BY MAGNETIC RESONANCE IMAGING: ICD-10-CM

## 2023-12-28 DIAGNOSIS — M79.89 LEG SWELLING: ICD-10-CM

## 2023-12-28 DIAGNOSIS — M79.605 LEFT LEG PAIN: ICD-10-CM

## 2023-12-28 PROCEDURE — 78315 BONE IMAGING 3 PHASE: CPT

## 2023-12-28 PROCEDURE — G1004 CDSM NDSC: HCPCS

## 2023-12-28 PROCEDURE — A9503 TC99M MEDRONATE: HCPCS

## 2024-01-03 NOTE — PROGRESS NOTES
Patient ID: Elizabeth Graham is a 68 y.o. female.    Assessment/Plan:    Left leg pain  She has very discrete pain in the left anterior leg along the tibia.  That same area is numb.  No preceding trauma.  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  MRI showed edema in the soleus muscle suggestive of inflammatory or infectious process.  CT-guided muscle biopsy done by interventional radiology showed scattered muscle fibers with fibroadipose tissue.  No suggestion of inflammatory cells or malignancy.  EMG done by me did not show evidence of tibial neuropathy, myopathy or other large fiber polyneuropathy.  Bone scan did not show changes consistent with CRPS.     The etiology remains unclear.  Aspercreme with lidocaine was not very helpful but she does still use it at nighttime.  I offered her other medications such as gabapentin, but she is reluctant to try it at this time.    Recommendations:  -She plans to see a sports medicine doctor in [place] who is a holistic doctor.  This may be helpful for her.  -Alternatively, she can consider acupuncture  -Advised to return to see me if she develops cramping, expansion of the numbness or pain or increased difficulty walking.    -After the visit, I noticed that I did not receive results for CPK.  I will contact the patient to see if it was done.  -    Leg swelling  She still has the swelling.  The compression sleeves are helpful.  She stopped wearing the boot.      Follow-up as needed    I have spent a total time of 35 minutes on 01/04/24 in caring for this patient including Diagnostic results, Instructions for management, Impressions, Counseling / Coordination of care, Documenting in the medical record, Reviewing / ordering tests, medicine, procedures  , and Obtaining or reviewing history  .             Subjective:    Mrs Graham is a 68 year old lady with PMH hyperthyroidism/ Graves (no longer on meds), myositis presents for neuromuscular follow up of left leg  "pain. Last seen in office on 12/7/23.    In September 2023, she was walking in the field, watering flowers.  All of a sudden, she felt like she was kicked in the shin.  Within 3 days, the leg became very swollen.    MRI left tibia showed edema in the soleus muscle.  Biopsy showed fibroadipose tissue and scattered muscle fibers.  She tried prednisone with minimal relief.  She was initially wearing a  boot which was helping.  She stopped using it after her EMG given no obvious nerve damage or fracture.    A few months ago, the pain was 5-6/10. Now the pain level is 3-4/10 if she is sedentary. The pain returns when she walks.     Topical lidocaine was not helpful. She read that it cannot be used with the compression sleeve. She does use it at night to help with her with sleep.     She started stretching this week.    She has still has numbness of the anterior shin. No weakness.      No change in sweating. No color changes. No allodynia. Some positions may provoke the pain.  Pain does not wake her. If she lets the left hang, it aches.      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  She was doing a lot of this about 3 to 4 weeks before her symptoms started, but this is her norm.           Objective:    Blood pressure 130/80, pulse 70, resp. rate 16, height 5' 2\" (1.575 m), weight 72.4 kg (159 lb 9.6 oz), SpO2 98%.    Physical Exam  Constitutional:       Appearance: Normal appearance.   HENT:      Mouth/Throat:      Mouth: Mucous membranes are moist.      Pharynx: Oropharynx is clear.   Eyes:      Conjunctiva/sclera: Conjunctivae normal.   Neck:      Vascular: No carotid bruit.   Cardiovascular:      Rate and Rhythm: Normal rate and regular rhythm.      Heart sounds: Normal heart sounds.   Pulmonary:      Effort: Pulmonary effort is normal.      Breath sounds: Normal " breath sounds.   Psychiatric:         Mood and Affect: Mood normal.         Behavior: Behavior normal.         Neurological Exam  Mental status: Awake, alert, oriented x 3.  Fluent speech without aphasia.     Cranial nerves: Extraocular movements intact.  Pupils equally round and reactive to light.  Visual fields full to confrontation.  Facial sensation intact.  Face symmetric.  Speech clear. Hearing intact.  Tongue, uvula, palate midline and intact.  Sternocleidomastoid intact.     Motor:   Deltoid: Right 5/5, left 5/5  Biceps: Right 5/5, left 5/5  Triceps: Right 5/5, left 5/5  : Right 5/5, left 5/5     Iliopsoas: Right 5/5, left 5/5  Quadriceps: Right 5/5, left 5/5  Tibialis anterior: Right 5/5, left 5/5  Medial gastrocnemius: Right 5/5, left 5/5  Peroneus longus: Right 5/5, left 5/5  Tibialis posterior: Right 5/5, left 5/5   Extensor hallucis longus: Right 5/5, left 5/5  Abductor hallucis: Right 5/5, left 5-/5     Cerebellar: No dysmetria noted today.  Gait is relatively normal.      Reflexes:   Biceps: Right 2+, left 2+  Triceps: Right 2+, left 2+  Brachioradialis: Right 2+, left 2+  Patellar: Right 2+, left 2+  Achilles: Right 2+, left 2+    Sensory: Light touch intact.  Temperature slightly reduced in the left medial leg and foot.,  And over the middle of the shin.  Vibration 5 seconds at the great toes.    ROS:    Review of Systems  Constitutional:  Negative for appetite change, fatigue and fever.   HENT: Negative.  Negative for hearing loss, tinnitus, trouble swallowing and voice change.    Eyes: Negative.  Negative for photophobia, pain and visual disturbance.   Respiratory: Negative.  Negative for shortness of breath.    Cardiovascular: Negative.  Negative for palpitations.   Gastrointestinal: Negative.  Negative for nausea and vomiting.   Endocrine: Negative.  Negative for cold intolerance.   Genitourinary: Negative.  Negative for dysuria, frequency and urgency.   Musculoskeletal:  Negative for back  pain, gait problem, myalgias, neck pain and neck stiffness.   Skin: Negative.  Negative for rash.   Allergic/Immunologic: Negative.    Neurological:  Positive for numbness. Negative for dizziness, tremors, seizures, syncope, facial asymmetry, speech difficulty, weakness, light-headedness and headaches.   Hematological: Negative.  Does not bruise/bleed easily.   Psychiatric/Behavioral: Negative.  Negative for confusion, hallucinations and sleep disturbance.    All other systems reviewed and are negative.       DATA:  Bone scan 12/28/23:  1. No scintigraphic abnormalities of the osseous structures. No findings to suggest left tibial or fibular pathology such as stress fracture. No scintigraphic findings are seen that would suggest complex regional pain syndrome of the left lower leg  2. Increased blood flow and blood pool soft tissue activity on the right compared to the left. This may be related to the patient favoring her left leg.  Less likely possibility would be diminished arterial blood flow to the left leg compared to the right, which could be excluded by clinical means or if necessary, Doppler ultrasound.    EMG left leg 12/20/2023:  No evidence of myopathy, left tibial neuropathy or large fiber neuropathy.

## 2024-01-04 ENCOUNTER — OFFICE VISIT (OUTPATIENT)
Dept: NEUROLOGY | Facility: CLINIC | Age: 69
End: 2024-01-04
Payer: MEDICARE

## 2024-01-04 VITALS
WEIGHT: 159.6 LBS | HEART RATE: 70 BPM | OXYGEN SATURATION: 98 % | DIASTOLIC BLOOD PRESSURE: 80 MMHG | RESPIRATION RATE: 16 BRPM | SYSTOLIC BLOOD PRESSURE: 130 MMHG | HEIGHT: 62 IN | BODY MASS INDEX: 29.37 KG/M2

## 2024-01-04 DIAGNOSIS — M79.605 LEFT LEG PAIN: Primary | ICD-10-CM

## 2024-01-04 PROCEDURE — 99214 OFFICE O/P EST MOD 30 MIN: CPT | Performed by: PSYCHIATRY & NEUROLOGY

## 2024-01-04 NOTE — ASSESSMENT & PLAN NOTE
She has very discrete pain in the left anterior leg along the tibia.  That same area is numb.  No preceding trauma.  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  MRI showed edema in the soleus muscle suggestive of inflammatory or infectious process.  CT-guided muscle biopsy done by interventional radiology showed scattered muscle fibers with fibroadipose tissue.  No suggestion of inflammatory cells or malignancy.  EMG done by me did not show evidence of tibial neuropathy, myopathy or other large fiber polyneuropathy.  Bone scan did not show changes consistent with CRPS.     The etiology remains unclear.  Aspercreme with lidocaine was not very helpful but she does still use it at nighttime.  I offered her other medications such as gabapentin, but she is reluctant to try it at this time.    Recommendations:  -She plans to see a sports medicine doctor in [place] who is a holistic doctor.  This may be helpful for her.  -Alternatively, she can consider acupuncture  -Advised to return to see me if she develops cramping, expansion of the numbness or pain or increased difficulty walking.    -After the visit, I noticed that I did not receive results for CPK.  I will contact the patient to see if it was done.  -

## 2024-01-04 NOTE — PROGRESS NOTES
Review of Systems   Constitutional:  Negative for appetite change, fatigue and fever.   HENT: Negative.  Negative for hearing loss, tinnitus, trouble swallowing and voice change.    Eyes: Negative.  Negative for photophobia, pain and visual disturbance.   Respiratory: Negative.  Negative for shortness of breath.    Cardiovascular: Negative.  Negative for palpitations.   Gastrointestinal: Negative.  Negative for nausea and vomiting.   Endocrine: Negative.  Negative for cold intolerance.   Genitourinary: Negative.  Negative for dysuria, frequency and urgency.   Musculoskeletal:  Negative for back pain, gait problem, myalgias, neck pain and neck stiffness.   Skin: Negative.  Negative for rash.   Allergic/Immunologic: Negative.    Neurological:  Positive for numbness. Negative for dizziness, tremors, seizures, syncope, facial asymmetry, speech difficulty, weakness, light-headedness and headaches.   Hematological: Negative.  Does not bruise/bleed easily.   Psychiatric/Behavioral: Negative.  Negative for confusion, hallucinations and sleep disturbance.    All other systems reviewed and are negative.

## 2024-01-05 LAB — CK SERPL-CCNC: 23 U/L (ref 29–143)

## 2024-01-18 ENCOUNTER — TELEPHONE (OUTPATIENT)
Age: 69
End: 2024-01-18

## 2024-01-18 NOTE — TELEPHONE ENCOUNTER
Caller: rico Frazier    Doctor: not spa    Reason for call: call transferred to ortho    Call back#: n/a

## 2024-01-26 ENCOUNTER — OFFICE VISIT (OUTPATIENT)
Dept: OBGYN CLINIC | Facility: CLINIC | Age: 69
End: 2024-01-26
Payer: MEDICARE

## 2024-01-26 VITALS
WEIGHT: 160.5 LBS | TEMPERATURE: 97.3 F | SYSTOLIC BLOOD PRESSURE: 161 MMHG | DIASTOLIC BLOOD PRESSURE: 93 MMHG | BODY MASS INDEX: 29.53 KG/M2 | HEART RATE: 89 BPM | HEIGHT: 62 IN

## 2024-01-26 DIAGNOSIS — S86.892A LEFT MEDIAL TIBIAL STRESS SYNDROME, INITIAL ENCOUNTER: Primary | ICD-10-CM

## 2024-01-26 DIAGNOSIS — M79.662 PAIN OF LEFT LOWER LEG: ICD-10-CM

## 2024-01-26 PROCEDURE — 99214 OFFICE O/P EST MOD 30 MIN: CPT | Performed by: FAMILY MEDICINE

## 2024-01-26 NOTE — PROGRESS NOTES
[facility] [address]  [facility] [address] [phone]      Assessment:  1. Left medial tibial stress syndrome, initial encounter  -     Ambulatory Referral to Physical Therapy; Future    2. Pain of left lower leg  -     Ambulatory Referral to Physical Therapy; Future        Plan:  Patient Instructions   F/u 6 wks  Begin physical therapy  Mild pes planus- consider arch supports if no improvement  Icing/heat/OTC pain meds as needed.     Return in about 6 weeks (around 3/8/2024) for Recheck.    Chief Complaint:  Chief Complaint   Patient presents with    Left Lower Leg - Pain     Left shin     Left Foot - Pain     Left heel        Subjective:   HPI    Patient ID: Elizabeth Graham is a 68 y.o. female     Here c/o L lower leg pain  Started about mid September 2023  She was walking and started having pain- felt like someone kicked her.  Pain continued for a few days- swelling started 3 days later in lower leg  She was limping..  taking ibuprofen  XR- neg, saw ortho- NWB 2 wks, crutches.    MRI showed soleous swelling  Muscle biopsy  Saw Rheum- blood work normal  Saw Neuro- bone scane neg/EMG- neg  Wore boot for 2 months.  12/5/23 started wearing compression stockings- helped, dec swelling, reduced pain  Getting better  Currently having shin pain- constant  Pain walking  She never went to PT.  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  Worse with prolonged walking.    Review of Systems   Constitutional:  Negative for fatigue and fever.   Respiratory:  Negative for shortness of breath.    Cardiovascular:  Negative for chest pain.   Gastrointestinal:  Negative for abdominal pain and nausea.   Genitourinary:  Negative for dysuria.   Musculoskeletal:  Positive for arthralgias and joint swelling.   Skin:  Negative for rash and wound.   Neurological:  Negative for weakness and  "headaches.       Objective:  Vitals:  /93 (BP Location: Left arm, Patient Position: Sitting, Cuff Size: Standard)   Pulse 89   Temp (!) 97.3 °F (36.3 °C) (Tympanic)   Ht 5' 2\" (1.575 m)   Wt 72.8 kg (160 lb 8 oz)   BMI 29.36 kg/m²     The following portions of the patient's history were reviewed and updated as appropriate: allergies, current medications, past family history, past medical history, past social history, past surgical history, and problem list.    Physical exam:  Physical Exam  Constitutional:       Appearance: Normal appearance. She is normal weight.   HENT:      Head: Normocephalic.   Eyes:      Extraocular Movements: Extraocular movements intact.   Pulmonary:      Effort: Pulmonary effort is normal.   Musculoskeletal:         General: Tenderness present.      Cervical back: Normal range of motion.      Comments: L medial tibial mid 1/3 TTP,  FROM, motor 5/5  Sensation intact  Mild pes planus  No pain with single/double foot raise   Skin:     General: Skin is warm and dry.   Neurological:      General: No focal deficit present.      Mental Status: She is alert and oriented to person, place, and time. Mental status is at baseline.   Psychiatric:         Mood and Affect: Mood normal.         Behavior: Behavior normal.         Thought Content: Thought content normal.         Judgment: Judgment normal.       Ortho Exam    I have personally reviewed pertinent films in PACS and my interpretation is XR_  L tib/fib- nml study. MRI-  soleus m inflammation/edema.      Elliott Russell MD   "

## 2024-01-26 NOTE — PATIENT INSTRUCTIONS
F/u 6 wks  Begin physical therapy  Mild pes planus- consider arch supports if no improvement  Icing/heat/OTC pain meds as needed.  If no improvement consider compartment testing.

## 2024-01-29 ENCOUNTER — EVALUATION (OUTPATIENT)
Dept: PHYSICAL THERAPY | Age: 69
End: 2024-01-29
Payer: MEDICARE

## 2024-01-29 DIAGNOSIS — M79.662 PAIN OF LEFT LOWER LEG: ICD-10-CM

## 2024-01-29 DIAGNOSIS — S86.892D LEFT MEDIAL TIBIAL STRESS SYNDROME, SUBSEQUENT ENCOUNTER: Primary | ICD-10-CM

## 2024-01-29 PROCEDURE — 97162 PT EVAL MOD COMPLEX 30 MIN: CPT | Performed by: PHYSICAL THERAPIST

## 2024-01-29 PROCEDURE — 97110 THERAPEUTIC EXERCISES: CPT | Performed by: PHYSICAL THERAPIST

## 2024-01-29 NOTE — PROGRESS NOTES
PT Evaluation     Today's date: 2024  Patient name: Elizabeth Graham  : 1955  MRN: [id number]  Referring provider: Elliott Russell MD  Dx:   Encounter Diagnosis     ICD-10-CM    1. Left medial tibial stress syndrome, subsequent encounter  S86.892D Ambulatory Referral to Physical Therapy      2. Pain of left lower leg  M79.662 Ambulatory Referral to Physical Therapy          Start Time: 1400  Stop Time: 1445  Total time in clinic (min): 45 minutes    Assessment  Assessment details: Pt is a 67 y/o female who presents with left leg pain. No further referral is necessary at this time. Pt has a movement impairment diagnosis of decrease ankle mobility similar to a pathoantomical diagnosis of medial tibial stress syndrome. Pt is experiencing pain, decreased strength, and decreased ROM. Pt has a positive prognosis. Pt would benefit from PT to address these impairments leading to increased functional capacity and improved quality of life.    Impairments: abnormal or restricted ROM, impaired physical strength, lacks appropriate home exercise program, pain with function, poor posture  and poor body mechanics  Understanding of Dx/Px/POC: good   Prognosis: good    Goals  Short Term Goals: to be achieved by 4 weeks  1) Patient to be independent with basic HEP.  2) Decrease pain to 2/10 at its worst.  3) Increase DF ROM by 5-10 degrees   4) Increase LE strength by 1/2 MMT grade in all deficient planes.    Long Term Goals: to be achieved by discharge  1) FOTO equal to or greater than 65.  2) Ambulation to improve to maximal level of function  3) Stair negotiation will improve to reciprocal.  4) Sit to stand transfers will improve to maximal level of function      Plan  Patient would benefit from: skilled physical therapy  Planned modality interventions: cryotherapy and thermotherapy: hydrocollator packs  Planned therapy interventions: neuromuscular re-education, patient education, stretching, strengthening,  "therapeutic activities, therapeutic exercise, therapeutic training, home exercise program and graded activity  Frequency: Twice a week for 10 weeks.  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 Patient started having a lot of pain and swelling and saw numerous doctors. After numerous testing was performed to rule out more sinister pathology it was ruled that symptoms are orthopedic in nature. Follow up with Dr. Russell on 3/8/24. Pt has no relevant PMH but does have a history of calf pain and tightness.   Quality of life: good    Patient Goals  Patient goals for therapy: decreased pain, independence with ADLs/IADLs, return to sport/leisure activities, increased strength and increased motion    Pain  Current pain ratin  At best pain ratin  At worst pain rating: 10  Quality: sharp, radiating and dull ache  Aggravating factors: stair climbing, standing, running, lifting and walking    Hand dominance: right          Objective     Concurrent Complaints  Negative for night pain, disturbed sleep, bladder dysfunction, bowel dysfunction, saddle (S4) numbness, cardiac problem, kidney problem, gallbladder problem, stomach problem, ulcer, appendix problem, spleen problem, pancreas problem, history of cancer, history of trauma and infection    Additional Special Questions  Lumbar spine screen is unremarkable    Tenderness   Left Ankle/Foot   Tenderness in the posterior tibial tendon. No tenderness in the Achilles insertion, anterior talofibular ligament and proximal Achilles.     Passive Range of Motion   Left Ankle/Foot    Dorsiflexion (ke): 10 degrees     Right Ankle/Foot    Dorsiflexion (ke): 20 degrees     Strength/Myotome Testing     Left Hip   Planes of Motion   Flexion: 4-  Abduction: 4-  External rotation: 4-    Left Knee "   Flexion: 5  Extension: 5    Left Ankle/Foot   Dorsiflexion: 4  Plantar flexion: 4-  Inversion: 4  Eversion: 4    Right Ankle/Foot   Dorsiflexion: 5  Plantar flexion: 5  Inversion: 5  Eversion: 5    Tests   Left Ankle/Foot   Negative for anterior drawer and Kelley.     Additional Tests Details  Assess foot further next visit    General Comments:      Ankle/Foot Comments   SLS: Mod sway 12 secs LLE vs 30 secs min sway on right                Visit/Unit Tracking  AUTH Status:  Date 1/29              NO Auth required Used 1              Visits till re-eval Remaining  11                     Precautions: N/a      Manuals             AP TC joint mob             STM gastroc-soleus                                       Neuro Re-Ed             Heel lifts             TB 4 way                                                                              Ther Ex                                                                                                                     Ther Activity                                       Gait Training                                       Modalities

## 2024-02-02 ENCOUNTER — OFFICE VISIT (OUTPATIENT)
Dept: PHYSICAL THERAPY | Age: 69
End: 2024-02-02
Payer: MEDICARE

## 2024-02-02 DIAGNOSIS — S86.892D LEFT MEDIAL TIBIAL STRESS SYNDROME, SUBSEQUENT ENCOUNTER: Primary | ICD-10-CM

## 2024-02-02 DIAGNOSIS — M79.662 PAIN OF LEFT LOWER LEG: ICD-10-CM

## 2024-02-02 PROCEDURE — 97112 NEUROMUSCULAR REEDUCATION: CPT | Performed by: PHYSICAL THERAPIST

## 2024-02-02 PROCEDURE — 97110 THERAPEUTIC EXERCISES: CPT | Performed by: PHYSICAL THERAPIST

## 2024-02-02 PROCEDURE — 97140 MANUAL THERAPY 1/> REGIONS: CPT | Performed by: PHYSICAL THERAPIST

## 2024-02-05 ENCOUNTER — OFFICE VISIT (OUTPATIENT)
Dept: PHYSICAL THERAPY | Age: 69
End: 2024-02-05
Payer: MEDICARE

## 2024-02-05 DIAGNOSIS — S86.892D LEFT MEDIAL TIBIAL STRESS SYNDROME, SUBSEQUENT ENCOUNTER: Primary | ICD-10-CM

## 2024-02-05 DIAGNOSIS — M79.662 PAIN OF LEFT LOWER LEG: ICD-10-CM

## 2024-02-05 PROCEDURE — 97110 THERAPEUTIC EXERCISES: CPT

## 2024-02-05 PROCEDURE — 97530 THERAPEUTIC ACTIVITIES: CPT

## 2024-02-05 PROCEDURE — 97140 MANUAL THERAPY 1/> REGIONS: CPT

## 2024-02-05 NOTE — PROGRESS NOTES
Daily Note     Today's date: 2024  Patient name: Elizabeth Graham  : 1955  MRN: [id number]  Referring provider: Elliott Russell MD  Dx:   Encounter Diagnosis     ICD-10-CM    1. Left medial tibial stress syndrome, subsequent encounter  S86.892D       2. Pain of left lower leg  M79.662                      Subjective: pt reports increased soreness after last session but starting to feel better past few days, pt reports TTP over L shin      Objective: See treatment diary below  Pt ed for proper fitting of compression stocking, recommended pt get properly measured /fitted for appropriate compression stocking to avoid inappropriate compression on L lower leg      Assessment: pt seems to have a good understanding of HEP and compression stocking, some decreased swelling noted after elevation and manual intervention     Plan: Continue per plan of care.  Progress treatment as tolerated.       Visit/Unit Tracking  AUTH Status:  Date             NO Auth required Used 1 2 3            Visits till re-eval Remaining  11 10 9                   Precautions: N/a      Manuals 2/2 2/5           AP TC joint mob JF            STM gastroc-soleus JF VK                                     Neuro Re-Ed 2/2 2/5           Heel lifts Nv  nv           TB 4 way PF 3x10 Rtb 2x10 ea           SLR supine and SL nv 2x10 ea B/L                                                               Ther Ex  2/5           Recumbent bike 8' p! 8'                                                                                                      Ther Activity                                       Gait Training  2/5           Rock back and forth flat ground 10' PT ed HEP 10'                        Modalities

## 2024-02-09 ENCOUNTER — OFFICE VISIT (OUTPATIENT)
Dept: PHYSICAL THERAPY | Age: 69
End: 2024-02-09
Payer: MEDICARE

## 2024-02-09 DIAGNOSIS — M79.662 PAIN OF LEFT LOWER LEG: ICD-10-CM

## 2024-02-09 DIAGNOSIS — S86.892D LEFT MEDIAL TIBIAL STRESS SYNDROME, SUBSEQUENT ENCOUNTER: Primary | ICD-10-CM

## 2024-02-09 PROCEDURE — 97530 THERAPEUTIC ACTIVITIES: CPT

## 2024-02-09 PROCEDURE — 97140 MANUAL THERAPY 1/> REGIONS: CPT

## 2024-02-09 PROCEDURE — 97110 THERAPEUTIC EXERCISES: CPT

## 2024-02-09 NOTE — PROGRESS NOTES
Daily Note     Today's date: 2024  Patient name: Elizabeth Graham  : 1955  MRN: [id number]  Referring provider: Elliott Russell MD  Dx:   Encounter Diagnosis     ICD-10-CM    1. Left medial tibial stress syndrome, subsequent encounter  S86.892D       2. Pain of left lower leg  M79.662                      Subjective: pt reports that she is doing pretty good today with minimal pain 3/10 in her lower left leg. Notes that she has seen a lot of improvements since her IE able to bend her knee more with decreased pain levels.       Objective: See treatment diary below      Assessment: Pt tolerated treatment well today with no complaints of pain. She began on the bike to start session, able to complete full revolutions right away. Weakness was present with ankle 4 way, mostly with eccentric inv/erv. Pt did feel looser at the end of session today.     Plan: Continue per plan of care.  Progress treatment as tolerated.       Visit/Unit Tracking  AUTH Status:  Date            NO Auth required Used 1 2 3 4 foto           Visits till re-eval Remaining  11 10 9 8                  Precautions: N/a      Manuals           AP TC joint mob JF            STM gastroc-soleus JF VK MM                                    Neuro Re-Ed           Heel lifts Nv  nv 2x10          TB 4 way PF 3x10 Rtb 2x10 ea Rtb 3x10 ea          SLR supine and SL nv 2x10 ea B/L 2x10 ea B/L                                                              Ther Ex            Recumbent bike 8' p! 8' 10'                                                                                                     Ther Activity                                       Gait Training            Rock back and forth flat ground 10' PT ed HEP 10' 5'                       Modalities

## 2024-02-12 ENCOUNTER — OFFICE VISIT (OUTPATIENT)
Dept: PHYSICAL THERAPY | Age: 69
End: 2024-02-12
Payer: MEDICARE

## 2024-02-12 DIAGNOSIS — M79.662 PAIN OF LEFT LOWER LEG: ICD-10-CM

## 2024-02-12 DIAGNOSIS — S86.892D LEFT MEDIAL TIBIAL STRESS SYNDROME, SUBSEQUENT ENCOUNTER: Primary | ICD-10-CM

## 2024-02-12 PROCEDURE — 97110 THERAPEUTIC EXERCISES: CPT | Performed by: PHYSICAL THERAPIST

## 2024-02-12 PROCEDURE — 97112 NEUROMUSCULAR REEDUCATION: CPT | Performed by: PHYSICAL THERAPIST

## 2024-02-12 PROCEDURE — 97140 MANUAL THERAPY 1/> REGIONS: CPT | Performed by: PHYSICAL THERAPIST

## 2024-02-12 NOTE — PROGRESS NOTES
Daily Note     Today's date: 2024  Patient name: Elizabeth Graham  : 1955  MRN: [id number]  Referring provider: Elliott Russell MD  Dx:   Encounter Diagnosis     ICD-10-CM    1. Left medial tibial stress syndrome, subsequent encounter  S86.892D       2. Pain of left lower leg  M79.662           Start Time: 0845  Stop Time: 0930  Total time in clinic (min): 45 minutes    Subjective: Pt reports improvements with symptoms and was able to work for six hours yesterday in standing.       Objective: See treatment diary below      Assessment: Tolerated treatment well. Pt's POC was progressed to include more gastroc and soleus strengthening. Patient demonstrated fatigue post treatment and would benefit from continued PT      Plan: Continue per plan of care.      Visit/Unit Tracking  AUTH Status:  Date           NO Auth required Used 1 2 3 4 foto 5          Visits till re-eval Remaining  11 10 9 8 7                 Precautions: N/a      Manuals          AP TC joint mob JF   JF         STM gastroc-soleus JF VK MM JF                                   Neuro Re-Ed          Heel lifts Nv  nv 2x10          TB 4 way PF 3x10 Rtb 2x10 ea Rtb 3x10 ea BTB 3x10         SLR supine and SL nv 2x10 ea B/L 2x10 ea B/L 2x10 b/l         SL heel raises on leg press with block    3x10 45#                                                Ther Ex            Recumbent bike 8' p! 8' 10' 10'                                                                                                    Ther Activity                                       Gait Training           Rock back and forth flat ground 10' PT ed HEP 10' 5' 5'                      Modalities                                                 [FreeTextEntry1] : Patient is a 2yo F with delayed vaccine schedule presenting for vaccine visit. Patient is feeling well and reports no symptoms or recent illnesses. PE unremarkable. Patient is to receive Hep B, DTAP, PCV, and influenza vaccines as planned per catch-up schedule. Counselled on side effects and return precautions.

## 2024-02-16 ENCOUNTER — OFFICE VISIT (OUTPATIENT)
Dept: PHYSICAL THERAPY | Age: 69
End: 2024-02-16
Payer: MEDICARE

## 2024-02-16 DIAGNOSIS — M79.662 PAIN OF LEFT LOWER LEG: ICD-10-CM

## 2024-02-16 DIAGNOSIS — S86.892D LEFT MEDIAL TIBIAL STRESS SYNDROME, SUBSEQUENT ENCOUNTER: Primary | ICD-10-CM

## 2024-02-16 PROCEDURE — 97110 THERAPEUTIC EXERCISES: CPT | Performed by: PHYSICAL THERAPIST

## 2024-02-16 PROCEDURE — 97112 NEUROMUSCULAR REEDUCATION: CPT | Performed by: PHYSICAL THERAPIST

## 2024-02-16 PROCEDURE — 97140 MANUAL THERAPY 1/> REGIONS: CPT | Performed by: PHYSICAL THERAPIST

## 2024-02-16 NOTE — PROGRESS NOTES
Daily Note     Today's date: 2024  Patient name: Elizabeth Graham  : 1955  MRN: [id number]  Referring provider: Elliott Russell MD  Dx:   Encounter Diagnosis     ICD-10-CM    1. Left medial tibial stress syndrome, subsequent encounter  S86.892D       2. Pain of left lower leg  M79.662           Start Time: 0930  Stop Time: 1015  Total time in clinic (min): 45 minutes    Subjective: Pt reports increased pain since last visit.       Objective: See treatment diary below      Assessment: Tolerated treatment well. POC progressed to include more manual to decrease pain in calf. Patient demonstrated fatigue post treatment and would benefit from continued PT      Plan: Continue per plan of care.      Visit/Unit Tracking  AUTH Status:  Date          NO Auth required Used 1 2 3 4 foto 5 6         Visits till re-eval Remaining  11 10 9 8 7 6                Precautions: N/a      Manuals         AP TC joint mob JF   JF JF        STM gastroc-soleus JF VK MM JF JF                                  Neuro Re-Ed         Heel lifts Nv  nv 2x10          TB 4 way PF 3x10 Rtb 2x10 ea Rtb 3x10 ea BTB 3x10         SLR supine and SL nv 2x10 ea B/L 2x10 ea B/L 2x10 b/l 3x10        SL heel raises on leg press with block    3x10 45# P!        [employer] gym     3x1'                                  Ther Ex            Recumbent bike 8' p! 8' 10' 10' 10'                                                                                                   Ther Activity                                       Gait Training           Rock back and forth flat ground 10' PT ed HEP 10' 5' 5' 5'                     Modalities

## 2024-02-19 ENCOUNTER — OFFICE VISIT (OUTPATIENT)
Dept: PHYSICAL THERAPY | Age: 69
End: 2024-02-19
Payer: MEDICARE

## 2024-02-19 DIAGNOSIS — S86.892D LEFT MEDIAL TIBIAL STRESS SYNDROME, SUBSEQUENT ENCOUNTER: Primary | ICD-10-CM

## 2024-02-19 DIAGNOSIS — M79.662 PAIN OF LEFT LOWER LEG: ICD-10-CM

## 2024-02-19 PROCEDURE — 97140 MANUAL THERAPY 1/> REGIONS: CPT | Performed by: PHYSICAL THERAPIST

## 2024-02-19 PROCEDURE — 97112 NEUROMUSCULAR REEDUCATION: CPT | Performed by: PHYSICAL THERAPIST

## 2024-02-19 PROCEDURE — 97110 THERAPEUTIC EXERCISES: CPT | Performed by: PHYSICAL THERAPIST

## 2024-02-19 NOTE — PROGRESS NOTES
"Daily Note     Today's date: 2024  Patient name: Elizabeth Graham  : 1955  MRN: [id number]  Referring provider: Elliott Russell MD  Dx:   Encounter Diagnosis     ICD-10-CM    1. Left medial tibial stress syndrome, subsequent encounter  S86.892D       2. Pain of left lower leg  M79.662           Start Time: 1015  Stop Time: 1100  Total time in clinic (min): 45 minutes    Subjective: Pt reports pain when not wearing compression garment.       Objective: See treatment diary below      Assessment: Tolerated treatment well. Patient educated on performing standing stretching interventions to decrease symptoms.  Patient demonstrated fatigue post treatment and would benefit from continued PT      Plan: Continue per plan of care.      Visit/Unit Tracking  AUTH Status:  Date         NO Auth required Used 1 2 3 4 foto 5 6 7        Visits till re-eval Remaining  11 10 9 8 7 6 5               Precautions: N/a      Manuals        AP TC joint mob JF   JF JF JF       STM gastroc-soleus JF VK MM JF JF JF       MWM AP TC joint standing      JF                    Neuro Re-Ed        Heel lifts Nv  nv 2x10          TB 4 way PF 3x10 Rtb 2x10 ea Rtb 3x10 ea BTB 3x10         SLR supine and SL and prone nv 2x10 ea B/L 2x10 ea B/L 2x10 b/l 3x10 3x10 b/l       SL heel raises on leg press with block    3x10 45# P!        [employer] gym     3x1'        Tandem stance      3x30\" b/l                    Ther Ex            Recumbent bike 8' p! 8' 10' 10' 10' 10'       Standing lunge on step for gastroc/soleus mob      15x10\" b/l                                                                                     Ther Activity                                       Gait Training           Rock back and forth flat ground 10' PT ed HEP 10' 5' 5' 5'                     Modalities                                                    "

## 2024-02-21 PROBLEM — Z01.419 ENCOUNTER FOR GYNECOLOGICAL EXAMINATION (GENERAL) (ROUTINE) WITHOUT ABNORMAL FINDINGS: Status: RESOLVED | Noted: 2019-12-04 | Resolved: 2024-02-21

## 2024-02-23 ENCOUNTER — OFFICE VISIT (OUTPATIENT)
Dept: PHYSICAL THERAPY | Age: 69
End: 2024-02-23
Payer: MEDICARE

## 2024-02-23 DIAGNOSIS — S86.892D LEFT MEDIAL TIBIAL STRESS SYNDROME, SUBSEQUENT ENCOUNTER: Primary | ICD-10-CM

## 2024-02-23 DIAGNOSIS — M79.662 PAIN OF LEFT LOWER LEG: ICD-10-CM

## 2024-02-23 PROCEDURE — 97110 THERAPEUTIC EXERCISES: CPT

## 2024-02-23 PROCEDURE — 97112 NEUROMUSCULAR REEDUCATION: CPT

## 2024-02-23 PROCEDURE — 97140 MANUAL THERAPY 1/> REGIONS: CPT

## 2024-02-23 NOTE — PROGRESS NOTES
"Daily Note     Today's date: 2024  Patient name: Elizabeth Graham  : 1955  MRN: [id number]  Referring provider: Elliott Russell MD  Dx:   Encounter Diagnosis     ICD-10-CM    1. Left medial tibial stress syndrome, subsequent encounter  S86.892D       2. Pain of left lower leg  M79.662                      Subjective: Pt reports that she is doing well today noting some discomfort in her L leg with soreness present. Notes that she was able to get out of her car and ambulate without any limp which is good progress for her.       Objective: See treatment diary below      Assessment: Tolerated treatment well. Patient continues to demonstrate a fair carryover with exercises this session. Swelling was present in her LLE today with tenderness on both medial and lateral side. Added in continued balance exercises being challenged but able to complete. Patient demonstrated fatigue post treatment and would benefit from continued PT      Plan: Continue per plan of care.      Visit/Unit Tracking  AUTH Status:  Date        NO Auth required Used 1 2 3 4 foto 5 6 7 8       Visits till re-eval Remaining  11 10 9 8 7 6 5 4              Precautions: N/a      Manuals       AP TC joint mob JF   JF JF JF       STM gastroc-soleus JF VK MM JF JF JF MM      MWM AP TC joint standing      JF Prom w/ OP standing MM                   Neuro Re-Ed       Heel lifts Nv  nv 2x10          TB 4 way PF 3x10 Rtb 2x10 ea Rtb 3x10 ea BTB 3x10         SLR supine and SL and prone nv 2x10 ea B/L 2x10 ea B/L 2x10 b/l 3x10 3x10 b/l 3x10 b/l      SL heel raises on leg press with block    3x10 45# P!        [employer] gym     3x1'        Tandem stance      3x30\" b/l 3x30\" b/l  foam     Tandem walk       10'x2 laps      Ball toss on foam       GMB foam 30x, tandem 20x ea      Ther Ex            Recumbent bike 8' p! 8' 10' 10' 10' 10' 10'      Standing " "lunge on step for gastroc/soleus mob      15x10\" b/l 10x10\" b/l                                                                                    Ther Activity                                       Gait Training  2/5 2/9 2/12         Rock back and forth flat ground 10' PT ed HEP 10' 5' 5' 5'                     Modalities                                                    "

## 2024-02-26 ENCOUNTER — OFFICE VISIT (OUTPATIENT)
Dept: PHYSICAL THERAPY | Age: 69
End: 2024-02-26
Payer: MEDICARE

## 2024-02-26 DIAGNOSIS — S86.892D LEFT MEDIAL TIBIAL STRESS SYNDROME, SUBSEQUENT ENCOUNTER: Primary | ICD-10-CM

## 2024-02-26 DIAGNOSIS — M79.662 PAIN OF LEFT LOWER LEG: ICD-10-CM

## 2024-02-26 PROCEDURE — 97110 THERAPEUTIC EXERCISES: CPT | Performed by: PHYSICAL THERAPIST

## 2024-02-26 PROCEDURE — 97140 MANUAL THERAPY 1/> REGIONS: CPT | Performed by: PHYSICAL THERAPIST

## 2024-02-26 PROCEDURE — 97112 NEUROMUSCULAR REEDUCATION: CPT | Performed by: PHYSICAL THERAPIST

## 2024-02-26 NOTE — PROGRESS NOTES
"Daily Note     Today's date: 2024  Patient name: Elizabeth Graham  : 1955  MRN: [id number]  Referring provider: Elliott Russell MD  Dx:   Encounter Diagnosis     ICD-10-CM    1. Left medial tibial stress syndrome, subsequent encounter  S86.892D       2. Pain of left lower leg  M79.662                      Subjective: Patient c/o burning in her anterior lower leg prior to treatment session.       Objective: See treatment diary below      Assessment: Patient demonstrated moderate soreness with STM; demonstrated significant challenge with balance activities.       Plan: Continue per plan of care.      Visit/Unit Tracking  AUTH Status:  Date       NO Auth required Used 1 2 3 4 foto 5 6 7 8 9      Visits till re-eval Remaining  11 10 9 8 7 6 5 4 3             Precautions: N/a      Manuals      AP TC joint mob JF   JF JF JF       STM gastroc-soleus JF VK MM JF JF JF MM KK     MWM AP TC joint standing      JF Prom w/ OP standing MM KK                  Neuro Re-Ed      Heel lifts Nv  nv 2x10          TB 4 way PF 3x10 Rtb 2x10 ea Rtb 3x10 ea BTB 3x10         SLR supine and SL and prone nv 2x10 ea B/L 2x10 ea B/L 2x10 b/l 3x10 3x10 b/l 3x10 b/l 3x10 b/l     SL heel raises on leg press with block    3x10 45# P!        Vigor gym     3x1'        Tandem stance      3x30\" b/l 3x30\" b/l  3x30\" b/l     Tandem walk       10'x2 laps 10'x2 laps     Ball toss on foam       GMB foam 30x, tandem 20x ea GMB foam mod tandem 30x     Ther Ex            Recumbent bike 8' p! 8' 10' 10' 10' 10' 10' 10'     Standing lunge on step for gastroc/soleus mob      15x10\" b/l 10x10\" b/l 10x10\" b/l                                                                                   Ther Activity                                       Gait Training           Rock back and forth flat ground 10' PT ed HEP 10' 5' 5' 5'  "                    Modalities

## 2024-03-01 ENCOUNTER — OFFICE VISIT (OUTPATIENT)
Dept: PHYSICAL THERAPY | Age: 69
End: 2024-03-01
Payer: MEDICARE

## 2024-03-01 DIAGNOSIS — M79.662 PAIN OF LEFT LOWER LEG: ICD-10-CM

## 2024-03-01 DIAGNOSIS — S86.892D LEFT MEDIAL TIBIAL STRESS SYNDROME, SUBSEQUENT ENCOUNTER: Primary | ICD-10-CM

## 2024-03-01 PROCEDURE — 97110 THERAPEUTIC EXERCISES: CPT | Performed by: PHYSICAL THERAPIST

## 2024-03-01 PROCEDURE — 97140 MANUAL THERAPY 1/> REGIONS: CPT | Performed by: PHYSICAL THERAPIST

## 2024-03-01 PROCEDURE — 97112 NEUROMUSCULAR REEDUCATION: CPT | Performed by: PHYSICAL THERAPIST

## 2024-03-01 NOTE — PROGRESS NOTES
"Daily Note     Today's date: 3/1/2024  Patient name: Elizabeth Graham  : 1955  MRN: [id number]  Referring provider: Elliott Russell MD  Dx:   Encounter Diagnosis     ICD-10-CM    1. Left medial tibial stress syndrome, subsequent encounter  S86.892D       2. Pain of left lower leg  M79.662                      Subjective: Patient states she continues to have burning sensation in anterior lower leg; states improved mobility in her left LE.        Objective: See treatment diary below      Assessment: Patient continues to demonstrate moderate difficulty with balance activities; moderate restriction in Gastroc/Soleus, decreased restriction after completion of STM; stated decreased pain at completion of treatment session.       Plan: Continue per plan of care.      Visit/Unit Tracking  AUTH Status:  Date 1/29 2/2 2/5 2/9 2/12 2/16 2/19 2/23 2/26 3/1     NO Auth required Used 1 2 3 4 foto 5 6 7 8 9 10     Visits till re-eval Remaining  11 10 9 8 7 6 5 4 3 2            Precautions: N/a      Manuals 2/2 2/5 2/9 2/12 2/16 2/19 2/23 2/26 3/1    AP TC joint mob JF   JF JF JF       STM gastroc-soleus JF VK MM JF JF JF MM KK KK    MWM AP TC joint standing      JF Prom w/ OP standing MM KK KK                 Neuro Re-Ed 2/2 2/5 2/9 2/12 2/16 2/19 2/23 2/26 3/1    Heel lifts Nv  nv 2x10          TB 4 way PF 3x10 Rtb 2x10 ea Rtb 3x10 ea BTB 3x10         SLR supine and SL and prone nv 2x10 ea B/L 2x10 ea B/L 2x10 b/l 3x10 3x10 b/l 3x10 b/l 3x10 b/l HEP    SL heel raises on leg press with block    3x10 45# P!        Vigor gym     3x1'        Tandem stance      3x30\" b/l 3x30\" b/l  3x30\" b/l 3x30\" b/l    Tandem walk       10'x2 laps 10'x2 laps 10'x4 laps    Ball toss on foam       GMB foam 30x, tandem 20x ea GMB foam mod tandem 30x GMB foam mod tandem 30x ea    Ther Ex            Recumbent bike 8' p! 8' 10' 10' 10' 10' 10' 10' 10'    Standing lunge on step for gastroc/soleus mob      15x10\" b/l 10x10\" b/l 10x10\" b/l HEP "                                                                                  Ther Activity                                       Gait Training  2/5 2/9 2/12         Rock back and forth flat ground 10' PT ed HEP 10' 5' 5' 5'                     Modalities

## 2024-03-04 ENCOUNTER — OFFICE VISIT (OUTPATIENT)
Dept: PHYSICAL THERAPY | Age: 69
End: 2024-03-04
Payer: MEDICARE

## 2024-03-04 DIAGNOSIS — M79.662 PAIN OF LEFT LOWER LEG: ICD-10-CM

## 2024-03-04 DIAGNOSIS — S86.892D LEFT MEDIAL TIBIAL STRESS SYNDROME, SUBSEQUENT ENCOUNTER: Primary | ICD-10-CM

## 2024-03-04 PROCEDURE — 97112 NEUROMUSCULAR REEDUCATION: CPT | Performed by: PHYSICAL THERAPIST

## 2024-03-04 PROCEDURE — 97140 MANUAL THERAPY 1/> REGIONS: CPT | Performed by: PHYSICAL THERAPIST

## 2024-03-04 NOTE — PROGRESS NOTES
"Daily Note     Today's date: 3/4/2024  Patient name: Elizabeth Graham  : 1955  MRN: [id number]  Referring provider: Elliott Russell MD  Dx:   No diagnosis found.                 Subjective: Patient reports a setback over the weekend. She notes an intense shooting pain up her lower leg and under the patella when going to bed. She denies any new activities that could have caused the symptoms and notes increased swelling in the lower leg. She notes increased pain in the morning and improved pain as she goes about her ADLs during day. She ambulated into the clinic with an antalgic gait and no knee flexion during swing phase.       Objective: See treatment diary below      Assessment: Focused on pain and edema management today. Patella, knee, and ankle joint demonstrated normal mobility. Educated the patient on edema management. Patient able to complete some balance activities today despite symptoms. Resume full program as able NV.       Plan: Continue per plan of care.      Visit/Unit Tracking  AUTH Status:  Date 1/29 2/2 2/5 2/9 2/12 2/16 2/19 2/23 2/26 3/1 3/4    NO Auth required Used 1 2 3 4 foto 5 6 7 8 9 10 11    Visits till re-eval Remaining  11 10 9 8 7 6 5 4 3 2 1           Precautions: N/a      Manuals  3 3/4   AP TC joint mob JF   JF JF JF       STM gastroc-soleus JF VK MM JF JF JF MM KK KK NB   MWM AP TC joint standing      JF Prom w/ OP standing MM KK KK    Lymph Massage          NB   Neuro Re-Ed  3 3/4   Heel lifts Nv  nv 2x10          TB 4 way PF 3x10 Rtb 2x10 ea Rtb 3x10 ea BTB 3x10         SLR supine and SL and prone nv 2x10 ea B/L 2x10 ea B/L 2x10 b/l 3x10 3x10 b/l 3x10 b/l 3x10 b/l HEP    SL heel raises on leg press with block    3x10 45# P!        Vigor gym     3x1'        Tandem stance      3x30\" b/l 3x30\" b/l  3x30\" b/l 3x30\" b/l 3x30\" b/l   Tandem walk       10'x2 laps 10'x2 laps 10'x4 laps 10'x4 laps   Ball toss " "on foam       GMB foam 30x, tandem 20x ea GMB foam mod tandem 30x GMB foam mod tandem 30x ea    Ther Ex  2/5 2/9          Recumbent bike 8' p! 8' 10' 10' 10' 10' 10' 10' 10' 10'   Standing lunge on step for gastroc/soleus mob      15x10\" b/l 10x10\" b/l 10x10\" b/l HEP                                                                                  Ther Activity                                       Gait Training  2/5 2/9 2/12         Rock back and forth flat ground 10' PT ed HEP 10' 5' 5' 5'                     Modalities                                                    "

## 2024-03-08 ENCOUNTER — OFFICE VISIT (OUTPATIENT)
Dept: PHYSICAL THERAPY | Age: 69
End: 2024-03-08
Payer: MEDICARE

## 2024-03-08 ENCOUNTER — OFFICE VISIT (OUTPATIENT)
Dept: OBGYN CLINIC | Facility: CLINIC | Age: 69
End: 2024-03-08
Payer: MEDICARE

## 2024-03-08 VITALS
WEIGHT: 163.2 LBS | DIASTOLIC BLOOD PRESSURE: 82 MMHG | HEIGHT: 62 IN | TEMPERATURE: 98.4 F | HEART RATE: 91 BPM | SYSTOLIC BLOOD PRESSURE: 151 MMHG | BODY MASS INDEX: 30.03 KG/M2

## 2024-03-08 DIAGNOSIS — M79.662 PAIN OF LEFT LOWER LEG: Primary | ICD-10-CM

## 2024-03-08 DIAGNOSIS — S86.892D LEFT MEDIAL TIBIAL STRESS SYNDROME, SUBSEQUENT ENCOUNTER: Primary | ICD-10-CM

## 2024-03-08 DIAGNOSIS — M79.662 PAIN OF LEFT LOWER LEG: ICD-10-CM

## 2024-03-08 DIAGNOSIS — M79.89 LEFT LEG SWELLING: ICD-10-CM

## 2024-03-08 DIAGNOSIS — S86.892A LEFT MEDIAL TIBIAL STRESS SYNDROME, INITIAL ENCOUNTER: ICD-10-CM

## 2024-03-08 DIAGNOSIS — M70.50 PES ANSERINE BURSITIS: ICD-10-CM

## 2024-03-08 PROCEDURE — 99214 OFFICE O/P EST MOD 30 MIN: CPT | Performed by: FAMILY MEDICINE

## 2024-03-08 PROCEDURE — 97140 MANUAL THERAPY 1/> REGIONS: CPT

## 2024-03-08 PROCEDURE — 97112 NEUROMUSCULAR REEDUCATION: CPT

## 2024-03-08 NOTE — PROGRESS NOTES
[facility] [address]  [facility] [address] [phone]      Assessment:  1. Pain of left lower leg  -     Ambulatory Referral to PT/OT Lymphedema Therapy; Future  -     Ambulatory Referral to Physical Therapy; Future    2. Left leg swelling  -     Ambulatory Referral to PT/OT Lymphedema Therapy; Future  -     Ambulatory Referral to Physical Therapy; Future    3. Left medial tibial stress syndrome, initial encounter    4. Pes anserine bursitis        Plan:  Patient Instructions   F/u 6 wks  Begin lymphedema clinic- maybe cause of swelling/pain  Try arch supports.  Unsure of cause of pain-  MTSS, lymphedema   Return in about 6 weeks (around 4/19/2024).    Chief Complaint:  Chief Complaint   Patient presents with    Left Lower Leg - Follow-up     Left shin       Subjective:   HPI    Patient ID: Elizabeth Graham is a 68 y.o. female     Here for f/u L lower leg pain  Going to PT- pain worsened after therapy on Friday. Pain worsened on Saturday night.  Swelling/pain, noticed lump in anterior shin-couldn't walk due to pain  Wearing compression hose  PT on Monday- massage- having less pain but more than previously.  Today foot swollen.  Pain walking  She never went to PT.  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  Worse with prolonged walking.    Review of Systems   Constitutional:  Negative for fatigue and fever.   Respiratory:  Negative for shortness of breath.    Cardiovascular:  Negative for chest pain.   Gastrointestinal:  Negative for abdominal pain and nausea.   Genitourinary:  Negative for dysuria.   Musculoskeletal:  Positive for arthralgias.   Skin:  Negative for rash and wound.   Neurological:  Negative for weakness and headaches.       Objective:  Vitals:  /82 (BP Location: Left arm, Patient Position: Sitting, Cuff Size: Standard)   Pulse 91   Temp 98.4 °F  "(36.9 °C) (Tympanic)   Ht 5' 2\" (1.575 m)   Wt 74 kg (163 lb 3.2 oz)   BMI 29.85 kg/m²     The following portions of the patient's history were reviewed and updated as appropriate: allergies, current medications, past family history, past medical history, past social history, past surgical history, and problem list.    Physical exam:  Physical Exam  Constitutional:       Appearance: Normal appearance. She is normal weight.   HENT:      Head: Normocephalic.   Eyes:      Extraocular Movements: Extraocular movements intact.   Pulmonary:      Effort: Pulmonary effort is normal.   Musculoskeletal:         General: Tenderness present.      Cervical back: Normal range of motion.      Comments: L mid tibia TTP  No swelling  No calf TTP, swelling  L medial prox tibia- pes anserine TTP   Skin:     General: Skin is warm and dry.   Neurological:      General: No focal deficit present.      Mental Status: She is alert and oriented to person, place, and time. Mental status is at baseline.   Psychiatric:         Mood and Affect: Mood normal.         Behavior: Behavior normal.         Thought Content: Thought content normal.         Judgment: Judgment normal.       Ortho Exam          Elliott Russell MD   "

## 2024-03-08 NOTE — PROGRESS NOTES
"Daily Note     Today's date: 3/8/2024  Patient name: Elizabeth Graham  : 1955  MRN: [id number]  Referring provider: Elliott Russell MD  Dx:   Encounter Diagnosis     ICD-10-CM    1. Left medial tibial stress syndrome, subsequent encounter  S86.892D       2. Pain of left lower leg  M79.662                        Subjective: Patient reports that she is doing so so today with minimal complaints of pain 3/10 mostly in her L shin. The swelling in the front of her leg has improved with some general swelling still present. Notes that she continues to wear compression socks.       Objective: See treatment diary below      Assessment: Focused on pain and edema management again this session beginning on the bike followed by manual treatment. Resumed more exercsies this session with good tolerance. Minimal to mod tenderness was present in her L gastroc feeling looser post manuals. Pt would benefit form continued PT.       Plan: Continue per plan of care.      Visit/Unit Tracking  AUTH Status:  Date 1/29 2/2 2/5 2/9 2/12 2/16 2/19 2/23 2/26 3/1 3/4 3/8   NO Auth required Used 1 2 3 4 foto 5 6 7 8 9 10 11 12   Visits till re-eval Remaining  11 10 9 8 7 6 5 4 3 2 1 0 RE nv          Precautions: N/a      Manuals 3/8   2/12 2/16 2/19 2/23 2/26 3/1 3/4   AP TC joint mob    JF JF JF       STM gastroc-soleus MM   JF JF JF MM KK KK NB   MWM AP TC joint standing      JF Prom w/ OP standing MM KK KK    Lymph Massage MM         NB   Neuro Re-Ed 3/8   2/12 2/16 2/19 2/23 2/26 3 3/4   Heel lifts             TB 4 way    BTB 3x10         SLR supine and SL and prone    2x10 b/l 3x10 3x10 b/l 3x10 b/l 3x10 b/l HEP    SL heel raises on leg press with block    3x10 45# P!        Vigor gym     3x1'        Tandem stance 2x30\"     3x30\" b/l 3x30\" b/l  3x30\" b/l 3x30\" b/l 3x30\" b/l   Tandem walk 10'x4 laps      10'x2 laps 10'x2 laps 10'x4 laps 10'x4 laps   Ball toss on foam GMB foam mod tandem 30x ea      GMB foam 30x, tandem 20x ea GMB " "foam mod tandem 30x GMB foam mod tandem 30x ea    Ther Ex             Recumbent bike 10'   10' 10' 10' 10' 10' 10' 10'   Standing lunge on step for gastroc/soleus mob 10x10\" b/l     15x10\" b/l 10x10\" b/l 10x10\" b/l HEP                                                                                  Ther Activity                                       Gait Training    2/12         Rock back and forth flat ground    5' 5'                     Modalities                                                    "

## 2024-03-08 NOTE — PATIENT INSTRUCTIONS
F/u 6 wks  Begin lymphedema clinic- maybe cause of swelling/pain  Try arch supports.  Unsure of cause of pain-  MTSS, lymphedema

## 2024-03-11 ENCOUNTER — EVALUATION (OUTPATIENT)
Dept: PHYSICAL THERAPY | Age: 69
End: 2024-03-11
Payer: MEDICARE

## 2024-03-11 DIAGNOSIS — M79.662 PAIN OF LEFT LOWER LEG: ICD-10-CM

## 2024-03-11 DIAGNOSIS — S86.892D LEFT MEDIAL TIBIAL STRESS SYNDROME, SUBSEQUENT ENCOUNTER: Primary | ICD-10-CM

## 2024-03-11 PROCEDURE — 97112 NEUROMUSCULAR REEDUCATION: CPT | Performed by: PHYSICAL THERAPIST

## 2024-03-11 PROCEDURE — 97110 THERAPEUTIC EXERCISES: CPT | Performed by: PHYSICAL THERAPIST

## 2024-03-11 PROCEDURE — 97140 MANUAL THERAPY 1/> REGIONS: CPT | Performed by: PHYSICAL THERAPIST

## 2024-03-12 NOTE — PROGRESS NOTES
Daily Note     Today's date: 3/11/2024  Patient name: Elizabeth Graham  : 1955  MRN: [id number]  Referring provider: Elliott Russell MD  Dx:   Encounter Diagnosis     ICD-10-CM    1. Left medial tibial stress syndrome, subsequent encounter  S86.892D       2. Pain of left lower leg  M79.662           Start Time: 1215  Stop Time: 1301  Total time in clinic (min): 46 minutes      Assessment  Assessment details: Pt is a 67 y/o female who presents with left leg pain. No further referral is necessary at this time. Pt has a movement impairment diagnosis of decrease ankle mobility similar to a pathoantomical diagnosis of medial tibial stress syndrome. Pt has seen improvements with pain and reports 50% improvements but gait is still very limited and symptoms are still moderately irritable. Continue to strengthen and increase ankle mobility.     Impairments: abnormal or restricted ROM, impaired physical strength, lacks appropriate home exercise program, pain with function, poor posture  and poor body mechanics  Understanding of Dx/Px/POC: good   Prognosis: good    Goals  Short Term Goals: to be achieved by 4 weeks - MET  1) Patient to be independent with basic HEP.  2) Decrease pain to 2/10 at its worst.  3) Increase DF ROM by 5-10 degrees   4) Increase LE strength by 1/2 MMT grade in all deficient planes.    Long Term Goals: to be achieved by discharge - In progress  1) FOTO equal to or greater than 65.  2) Ambulation to improve to maximal level of function  3) Stair negotiation will improve to reciprocal.  4) Sit to stand transfers will improve to maximal level of function      Plan  Patient would benefit from: skilled physical therapy  Planned modality interventions: cryotherapy and thermotherapy: hydrocollator packs  Planned therapy interventions: neuromuscular re-education, patient education, stretching, strengthening, therapeutic activities, therapeutic exercise, therapeutic training, home exercise program and  "graded activity  Frequency: Twice a week for 8 weeks.  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 Patient started having a lot of pain and swelling and saw numerous doctors. After numerous testing was performed to rule out more sinister pathology it was ruled that symptoms are orthopedic in nature. Follow up with Dr. Russell on 3/8/24. Pt has no relevant PMH but does have a history of calf pain and tightness.   Quality of life: good    Patient Goals  Patient goals for therapy: decreased pain, independence with ADLs/IADLs, return to sport/leisure activities, increased strength and increased motion    Pain  Current pain ratin  At best pain ratin  At worst pain rating: 10  Quality: sharp, radiating and dull ache  Aggravating factors: stair climbing, standing, running, lifting and walking    Hand dominance: right          Objective     Concurrent Complaints  Negative for night pain, disturbed sleep, bladder dysfunction, bowel dysfunction, saddle (S4) numbness, cardiac problem, kidney problem, gallbladder problem, stomach problem, ulcer, appendix problem, spleen problem, pancreas problem, history of cancer, history of trauma and infection    Additional Special Questions  Lumbar spine screen is unremarkable    Tenderness   Left Ankle/Foot   Tenderness in the posterior tibial tendon. No tenderness in the Achilles insertion, anterior talofibular ligament and proximal Achilles.     Passive Range of Motion   Left Ankle/Foot    Dorsiflexion (ke): 10 degrees     Right Ankle/Foot    Dorsiflexion (ke): 20 degrees     Strength/Myotome Testing     Left Hip   Planes of Motion   Flexion: 4  Abduction: 4  External rotation: 4    Left Knee   Flexion: 5  Extension: 5    Left Ankle/Foot   Dorsiflexion: 4  Plantar flexion: 4  Inversion: " "4  Eversion: 4    Right Ankle/Foot   Dorsiflexion: 5  Plantar flexion: 5  Inversion: 5  Eversion: 5    Tests   Left Ankle/Foot   Negative for anterior drawer and Kelley.     Additional Tests Details  DECREASE MOBILITY OF ANKLE AND LATERAL MALLEOUS    General Comments:      Ankle/Foot Comments   SLS: Mod sway 20 secs LLE vs 30 secs min sway on right              Visit/Unit Tracking  AUTH Status:  Date 3/11              NO Auth required Used 13              Visits till re-eval Remaining  11                     Precautions: N/a      Manuals 3/11         3/4   AP TC joint mob JF            STM gastroc-soleus JF         NB   MWM AP TC joint standing JF            Lymph Massage          NB   Neuro Re-Ed 3/11         3/4   Heel lifts             TB 4 way             SLR supine and SL and prone             SL heel raises on leg press with block 3x10            Vigor gym             Tandem stance 2x30\"         3x30\" b/l   Tandem walk 10'x4 laps         10'x4 laps   Ball toss on foam GMB foam mod tandem 30x ea            Ther Ex             Recumbent bike 10'         10'   Standing lunge on step for gastroc/soleus mob 10x10\" b/l                                                                                          Ther Activity                                       Gait Training             Rock back and forth flat ground                          Modalities                                                      "

## 2024-03-15 ENCOUNTER — ANNUAL EXAM (OUTPATIENT)
Dept: OBGYN CLINIC | Facility: MEDICAL CENTER | Age: 69
End: 2024-03-15
Payer: MEDICARE

## 2024-03-15 ENCOUNTER — OFFICE VISIT (OUTPATIENT)
Dept: PHYSICAL THERAPY | Age: 69
End: 2024-03-15
Payer: MEDICARE

## 2024-03-15 VITALS
SYSTOLIC BLOOD PRESSURE: 146 MMHG | DIASTOLIC BLOOD PRESSURE: 90 MMHG | BODY MASS INDEX: 30.78 KG/M2 | WEIGHT: 163 LBS | HEIGHT: 61 IN

## 2024-03-15 DIAGNOSIS — Z12.31 ENCOUNTER FOR SCREENING MAMMOGRAM FOR BREAST CANCER: Primary | ICD-10-CM

## 2024-03-15 DIAGNOSIS — Z78.0 ENCOUNTER FOR OSTEOPOROSIS SCREENING IN ASYMPTOMATIC POSTMENOPAUSAL PATIENT: ICD-10-CM

## 2024-03-15 DIAGNOSIS — M79.662 PAIN OF LEFT LOWER LEG: ICD-10-CM

## 2024-03-15 DIAGNOSIS — Z13.820 ENCOUNTER FOR OSTEOPOROSIS SCREENING IN ASYMPTOMATIC POSTMENOPAUSAL PATIENT: ICD-10-CM

## 2024-03-15 DIAGNOSIS — S86.892D LEFT MEDIAL TIBIAL STRESS SYNDROME, SUBSEQUENT ENCOUNTER: Primary | ICD-10-CM

## 2024-03-15 PROCEDURE — 97140 MANUAL THERAPY 1/> REGIONS: CPT

## 2024-03-15 PROCEDURE — 99213 OFFICE O/P EST LOW 20 MIN: CPT | Performed by: NURSE PRACTITIONER

## 2024-03-15 PROCEDURE — 97110 THERAPEUTIC EXERCISES: CPT

## 2024-03-15 PROCEDURE — 97112 NEUROMUSCULAR REEDUCATION: CPT

## 2024-03-15 NOTE — PATIENT INSTRUCTIONS
Follow up with PCP for elevated BP.   Follow up with Dr Gonsalez for menopausal management.   Calcium [id number] mg (in divided doses-max 600 mg at one time) + 800-1000 IU Vit D daily unless otherwise directed. Avoid falls.   Exercise 150-300 minutes per week minimum including weight bearing exercises. DEXA scan-  ordered    Call your insurance company to verify coverage prior to completing any ordered tests.    No further paps after age 65 as long as there has been adequate normal paps completed, no history of SHERIN 2  or a more severe pap diagnosis in the last 20 years.     Annual mammogram ordered and monthly breast self exam recommended .     Colonoscopy- due in one year          Kegels 20 times twice daily.  Vaginal moisturizers twice weekly as needed. Return to office in one year or sooner, if needed.

## 2024-03-15 NOTE — PROGRESS NOTES
Assessment/Plan:  Follow up with PCP for elevated BP.   Follow up with Dr Gonsalez for menopausal management.   Calcium 8987-4103 mg (in divided doses-max 600 mg at one time) + 800-1000 IU Vit D daily unless otherwise directed. Avoid falls.   Exercise 150-300 minutes per week minimum including weight bearing exercises. DEXA scan-  ordered    Call your insurance company to verify coverage prior to completing any ordered tests.    No further paps after age 65 as long as there has been adequate normal paps completed, no history of SHERIN 2  or a more severe pap diagnosis in the last 20 years.     Annual mammogram ordered and monthly breast self exam recommended .     Colonoscopy- due in one year          Kegels 20 times twice daily.  Vaginal moisturizers twice weekly as needed. Return to office in one year or sooner, if needed.        1. Encounter for screening mammogram for breast cancer  -     Mammo screening bilateral w 3d & cad; Future; Expected date: 2024    2. Encounter for osteoporosis screening in asymptomatic postmenopausal patient  -     DXA bone density spine hip and pelvis; Future    3. BMI 30.0-30.9,adult               Subjective:      Patient ID: Elizabeth Graham is a 69 y.o. female.    HPI    Elizabeth Graham is a 69 y.o.  female who is here today for a follow up visit.   She is following with Dr Gonsalez for menopausal symptom management.   BP of 146/90. Asymptomatic. Follow with PCP.   Menopausal with no vaginal bleeding.   Exercise- most days of the week; limited currently-> goes to PT.   Works 3 times per week in an [employer] firm.   Elizabeth Graham is not sexually active with male partner/  of 31 years. This is acceptable to her.  Monogamous and feels safe in this relationship. She uses a compounded estriol/DHEA gel and estradiol cream intravaginally. Denies vaginal pain,bleeding or dryness.    She is not interested in STD screening today.   She denies vaginal discharge, itching or pelvic  "pain.   She has no urinary concerns, does not have incontinence.  No bowel concerns.  No breast concerns.     Last pap: admits to consistent normal lifetime paps   8/18/16 normal   12/4/19 normal pap with negative HR HPV   DEXA scan: Not on file; refused  Mammogram: 11/13/2023 normal   Colonoscopy:Up to date; done around age 60       Family history of cancer:   Cancer-related family history includes Cancer in her brother, father, mother, and paternal grandmother; Skin cancer in her brother, father, and paternal aunt. There is no history of Breast cancer, Ovarian cancer, or Colon cancer.      The following portions of the patient's history were reviewed and updated as appropriate: allergies, current medications, past family history, past medical history, past social history, past surgical history, and problem list.    Review of Systems   Constitutional: Negative.  Negative for activity change, appetite change, chills, diaphoresis, fatigue, fever and unexpected weight change.   HENT:  Negative for congestion, dental problem, sneezing, sore throat and trouble swallowing.    Eyes:  Negative for visual disturbance.   Respiratory:  Negative for chest tightness and shortness of breath.    Cardiovascular:  Negative for chest pain and leg swelling.   Gastrointestinal:  Negative for abdominal pain, constipation, diarrhea, nausea and vomiting.   Genitourinary:  Negative for difficulty urinating, dysuria, frequency, hematuria, pelvic pain, urgency, vaginal bleeding, vaginal discharge and vaginal pain.   Musculoskeletal:  Negative for back pain and neck pain.   Skin: Negative.    Allergic/Immunologic: Negative.    Neurological:  Negative for weakness and headaches.   Hematological:  Negative for adenopathy.   Psychiatric/Behavioral: Negative.           Objective:      /90 (BP Location: Left arm, Patient Position: Sitting, Cuff Size: Standard)   Ht 5' 1\" (1.549 m)   Wt 73.9 kg (163 lb)   BMI 30.80 kg/m²          Physical " Exam  Vitals and nursing note reviewed.   Constitutional:       Appearance: Normal appearance. She is well-developed.      Comments: Bmi 30   HENT:      Head: Normocephalic.   Neck:      Thyroid: No thyromegaly.   Cardiovascular:      Rate and Rhythm: Normal rate and regular rhythm.      Heart sounds: Normal heart sounds.   Pulmonary:      Effort: Pulmonary effort is normal.      Breath sounds: Normal breath sounds.   Chest:   Breasts:     Breasts are symmetrical.      Right: Normal. No inverted nipple, mass, nipple discharge, skin change or tenderness.      Left: Normal. No inverted nipple, mass, nipple discharge, skin change or tenderness.   Abdominal:      Palpations: Abdomen is soft.   Genitourinary:     General: Normal vulva.      Exam position: Lithotomy position.      Labia:         Right: No rash, tenderness, lesion or injury.         Left: No rash, tenderness, lesion or injury.       Urethra: No prolapse, urethral pain, urethral swelling or urethral lesion.      Vagina: No signs of injury and foreign body. No vaginal discharge, erythema, tenderness, bleeding, lesions or prolapsed vaginal walls.      Cervix: Normal.      Uterus: Normal.       Adnexa: Right adnexa normal and left adnexa normal.        Right: No mass, tenderness or fullness.          Left: No mass, tenderness or fullness.        Rectum: No external hemorrhoid.      Comments: Vulvovaginal atrophy  Vaginal tone 4/5  Musculoskeletal:         General: Normal range of motion.      Cervical back: Normal range of motion.   Lymphadenopathy:      Head:      Right side of head: No submental, submandibular, tonsillar or occipital adenopathy.      Left side of head: No submental, submandibular, tonsillar or occipital adenopathy.      Upper Body:      Right upper body: No supraclavicular or axillary adenopathy.      Left upper body: No supraclavicular or axillary adenopathy.      Lower Body: No right inguinal adenopathy. No left inguinal adenopathy.    Skin:     General: Skin is warm and dry.   Neurological:      Mental Status: She is alert and oriented to person, place, and time.   Psychiatric:         Mood and Affect: Mood normal.         Behavior: Behavior normal. Behavior is cooperative.

## 2024-03-15 NOTE — PROGRESS NOTES
"Daily Note     Today's date: 3/15/2024  Patient name: Elizabeth Graham  : 1955  MRN: [id number]  Referring provider: Elliott Russell MD  Dx:   Encounter Diagnosis     ICD-10-CM    1. Left medial tibial stress syndrome, subsequent encounter  S86.892D       2. Pain of left lower leg  M79.662                      Subjective: Pt reports that she is not wearing her compression stockings today. Notes that she is having about the same amount of pain the last couple of days, continued pain in her L shin bone.       Objective: See treatment diary below      Assessment: Tolerated treatment well. Began on the bike as an active warmup f/b manual massage along with joint mobilizations by PT, JF.  Patient demonstrated fatigue post treatment, exhibited good technique with therapeutic exercises, and would benefit from continued PT      Plan: Continue per plan of care.      Visit/Unit Tracking  AUTH Status:  Date 3/11 3/15             NO Auth required Used 13 14             Visits till re-eval Remaining  11 10                    Precautions: N/a      Manuals 3/11 3/15        3/4   AP TC joint mob JF JF           STM gastroc-soleus JF MM        NB   MWM AP TC joint standing JF            Lymph Massage          NB   Neuro Re-Ed 3/11 3/15        3/4   Heel lifts             TB 4 way             SLR supine and SL and prone             SL heel raises on leg press with block 3x10 35# 3x10           Vigor gym             Tandem stance 2x30\" 2x30\"        3x30\" b/l   Tandem walk 10'x4 laps 10'x4 laps        10'x4 laps   Ball toss on foam GMB foam mod tandem 30x ea GMB foam mod tandem 30x ea           Ther Ex             Recumbent bike 10' 10'        10'   Standing lunge on step for gastroc/soleus mob 10x10\" b/l 10x10\" b/l                                                                                         Ther Activity                                       Gait Training             Rock back and forth flat ground                  "         Modalities

## 2024-03-18 ENCOUNTER — OFFICE VISIT (OUTPATIENT)
Dept: PHYSICAL THERAPY | Age: 69
End: 2024-03-18
Payer: MEDICARE

## 2024-03-18 DIAGNOSIS — S86.892D LEFT MEDIAL TIBIAL STRESS SYNDROME, SUBSEQUENT ENCOUNTER: Primary | ICD-10-CM

## 2024-03-18 DIAGNOSIS — M79.662 PAIN OF LEFT LOWER LEG: ICD-10-CM

## 2024-03-18 PROCEDURE — 97140 MANUAL THERAPY 1/> REGIONS: CPT

## 2024-03-18 PROCEDURE — 97110 THERAPEUTIC EXERCISES: CPT

## 2024-03-18 PROCEDURE — 97112 NEUROMUSCULAR REEDUCATION: CPT

## 2024-03-18 NOTE — PROGRESS NOTES
"Daily Note     Today's date: 3/18/2024  Patient name: Elizabeth Graham  : 1955  MRN: [id number]  Referring provider: Elliott Russell MD  Dx:   Encounter Diagnosis     ICD-10-CM    1. Left medial tibial stress syndrome, subsequent encounter  S86.892D       2. Pain of left lower leg  M79.662                      Subjective: pt reports feeling better since beginning therapy but still has L shin pain, pt reports she is walking more and notes increased L lower leg pain when she doesn't wear compression stocking      Objective: See treatment diary below      Assessment: ex progressions to improve proprioception and balance ronda well, overall less swelling L LE vs last time I treated pt      Plan: Continue per plan of care.  Progress treatment as tolerated.       Visit/Unit Tracking  AUTH Status:  Date 3/11 3/15 3/18            NO Auth required Used  14 15            Visits till re-eval Remaining  11 10 9                   Precautions: N/a      Manuals 3/11 3/15 3/18       3/4   AP TC joint mob JF JF           STM gastroc-soleus JF MM VK       NB   MWM AP TC joint standing JF            Lymph Massage          NB   Neuro Re-Ed 3/11 3/15 3/18       3/4   Heel lifts             TB 4 way             SLR supine and SL and prone             SL heel raises on leg press with block 3x10 35# 3x10 35# 3x10          Vigor gym             Tandem stance 2x30\" 2x30\" 2x30\"ea B/L       3x30\" b/l   Tandem walk 10'x4 laps 10'x4 laps 4x20' across floor       10'x4 laps   Ball toss on foam GMB foam mod tandem 30x ea GMB foam mod tandem 30x ea Gmb 2x30 ea B/L          SLS   2x30\" ea B/L          Ther Ex   3/18          Recumbent bike 10' 10' 10' NS       10'   Standing lunge on step for gastroc/soleus mob 10x10\" b/l 10x10\" b/l 10x10\" ea B/L                                                                                        Ther Activity                                       Gait Training             Rock back and forth flat ground  "                         Modalities

## 2024-03-22 ENCOUNTER — OFFICE VISIT (OUTPATIENT)
Dept: PHYSICAL THERAPY | Age: 69
End: 2024-03-22
Payer: MEDICARE

## 2024-03-22 DIAGNOSIS — S86.892D LEFT MEDIAL TIBIAL STRESS SYNDROME, SUBSEQUENT ENCOUNTER: Primary | ICD-10-CM

## 2024-03-22 DIAGNOSIS — M79.662 PAIN OF LEFT LOWER LEG: ICD-10-CM

## 2024-03-22 PROCEDURE — 97140 MANUAL THERAPY 1/> REGIONS: CPT | Performed by: PHYSICAL THERAPIST

## 2024-03-22 PROCEDURE — 97110 THERAPEUTIC EXERCISES: CPT | Performed by: PHYSICAL THERAPIST

## 2024-03-22 PROCEDURE — 97112 NEUROMUSCULAR REEDUCATION: CPT | Performed by: PHYSICAL THERAPIST

## 2024-03-22 NOTE — PROGRESS NOTES
"Daily Note     Today's date: 3/22/2024  Patient name: Elizabeth Graham  : 1955  MRN: [id number]  Referring provider: Elliott Russell MD  Dx:   Encounter Diagnosis     ICD-10-CM    1. Left medial tibial stress syndrome, subsequent encounter  S86.892D       2. Pain of left lower leg  M79.662           Start Time: 1300  Stop Time: 1345  Total time in clinic (min): 45 minutes    Subjective: Pt reports great improvements in symptoms.       Objective: See treatment diary below      Assessment: Tolerated treatment well. Patient demonstrated fatigue post treatment and would benefit from continued PT      Plan: Continue per plan of care.      Visit/Unit Tracking  AUTH Status:  Date 3/11 3/15 3/18 3/22           NO Auth required - KX mod Used 13 14 15 16           Visits till re-eval Remaining  11 10 9 8                  Precautions: N/a      Manuals 3/11 3/15 3/18 3/22      3/4   AP TC joint mob JF JF           STM gastroc-soleus JF MM VK JF      NB   MWM AP TC joint standing JF            Lymph Massage          NB   Neuro Re-Ed 3/11 3/15 3/18 3/22      3/4   Heel lifts             TB 4 way             SLR supine and SL and prone             SL heel raises on leg press with block 3x10 35# 3x10 35# 3x10 45# 3x10         Vigor gym             Tandem stance 2x30\" 2x30\" 2x30\"ea B/L 3x30\" b/l      3x30\" b/l   Tandem walk 10'x4 laps 10'x4 laps 4x20' across floor 4x20' across floor      10'x4 laps   Ball toss on foam GMB foam mod tandem 30x ea GMB foam mod tandem 30x ea Gmb 2x30 ea B/L Rmb 2x30 ea B/L         SLS   2x30\" ea B/L          Ther Ex   3/18          Recumbent bike 10' 10' 10' NS 10' bike      10'   Standing lunge on step for gastroc/soleus mob 10x10\" b/l 10x10\" b/l 10x10\" ea B/L 10x10\" b/l                                                                                       Ther Activity                                       Gait Training             Rock back and forth flat ground                        "   Modalities

## 2024-03-25 ENCOUNTER — OFFICE VISIT (OUTPATIENT)
Dept: PHYSICAL THERAPY | Age: 69
End: 2024-03-25
Payer: MEDICARE

## 2024-03-25 DIAGNOSIS — S86.892D LEFT MEDIAL TIBIAL STRESS SYNDROME, SUBSEQUENT ENCOUNTER: Primary | ICD-10-CM

## 2024-03-25 DIAGNOSIS — M79.662 PAIN OF LEFT LOWER LEG: ICD-10-CM

## 2024-03-25 PROCEDURE — 97110 THERAPEUTIC EXERCISES: CPT

## 2024-03-25 PROCEDURE — 97112 NEUROMUSCULAR REEDUCATION: CPT

## 2024-03-25 PROCEDURE — 97140 MANUAL THERAPY 1/> REGIONS: CPT

## 2024-03-25 NOTE — PROGRESS NOTES
"Daily Note     Today's date: 3/25/2024  Patient name: Elizabeth Graham  : 1955  MRN: [id number]  Referring provider: Elliott Russell MD  Dx:   Encounter Diagnosis     ICD-10-CM    1. Left medial tibial stress syndrome, subsequent encounter  S86.892D       2. Pain of left lower leg  M79.662                      Subjective: Pt reports that she was outdoors working around the house for 2 hours noting that her leg did well overall and still feels good.       Objective: See treatment diary below      Assessment: Tolerated treatment well. Pt was able to begin on the bike to start session. She continues to demonstrate good carryover with her current exercises. Less tenderness and swelling present in her LLE.  Patient demonstrated fatigue post treatment and would benefit from continued PT      Plan: Continue per plan of care.      Visit/Unit Tracking  AUTH Status:  Date 3/11 3/15 3/18 3/22 3/25          NO Auth required - KX mod Used 13 14 15 16 17          Visits till re-eval Remaining  11 10 9 8 7                 Precautions: N/a      Manuals 3/11 3/15 3/18 3/22 3/25     3/4   AP TC joint mob JF JF           STM gastroc-soleus JF MM VK JF MM     NB   MWM AP TC joint standing JF            Lymph Massage          NB   Neuro Re-Ed 3/11 3/15 3/18 3/22 3/25     3/4   Heel lifts             TB 4 way             SLR supine and SL and prone             SL heel raises on leg press with block 3x10 35# 3x10 35# 3x10 45# 3x10 45# 3x10        Vigor gym             Tandem stance 2x30\" 2x30\" 2x30\"ea B/L 3x30\" b/l 3x30\" b/l     3x30\" b/l   Tandem walk 10'x4 laps 10'x4 laps 4x20' across floor 4x20' across floor 4x20' across floor     10'x4 laps   Ball toss on foam GMB foam mod tandem 30x ea GMB foam mod tandem 30x ea Gmb 2x30 ea B/L Rmb 2x30 ea B/L Rmb 2x30 ea B/L        SLS   2x30\" ea B/L          Ecc lowering HR     2 up 1 dwn 20x        Ther Ex   3/18  3/25        Recumbent bike 10' 10' 10' NS 10' bike 10'     10'   Standing " "lunge on step for gastroc/soleus mob 10x10\" b/l 10x10\" b/l 10x10\" ea B/L 10x10\" b/l 10x10\" b/l                                                                                      Ther Activity                                       Gait Training             Rock back and forth flat ground                          Modalities                                                            "

## 2024-03-29 ENCOUNTER — OFFICE VISIT (OUTPATIENT)
Dept: PHYSICAL THERAPY | Age: 69
End: 2024-03-29
Payer: MEDICARE

## 2024-03-29 DIAGNOSIS — S86.892D LEFT MEDIAL TIBIAL STRESS SYNDROME, SUBSEQUENT ENCOUNTER: Primary | ICD-10-CM

## 2024-03-29 DIAGNOSIS — M79.662 PAIN OF LEFT LOWER LEG: ICD-10-CM

## 2024-03-29 PROCEDURE — 97110 THERAPEUTIC EXERCISES: CPT

## 2024-03-29 PROCEDURE — 97140 MANUAL THERAPY 1/> REGIONS: CPT

## 2024-03-29 PROCEDURE — 97112 NEUROMUSCULAR REEDUCATION: CPT

## 2024-03-29 NOTE — PROGRESS NOTES
"Daily Note     Today's date: 3/29/2024  Patient name: Elizabeth Graham  : 1955  MRN: [id number]  Referring provider: Elliott Russell MD  Dx:   Encounter Diagnosis     ICD-10-CM    1. Left medial tibial stress syndrome, subsequent encounter  S86.892D       2. Pain of left lower leg  M79.662                      Subjective: Pt reports that she has had some soreness in her L shin the last 2 days. Overall the swelling continues to be about the same.       Objective: See treatment diary below      Assessment: Tolerated treatment well. Pt was able to begin on the bike to start session. Added in some soleus strengthening as well as progressing her balance exercises as charted below. Add in leg press eccentrics NV to promote strengthening. Anterior tib tenderness was present with palpation. Patient demonstrated fatigue post treatment and would benefit from continued PT      Plan: Continue per plan of care.      Visit/Unit Tracking  AUTH Status:  Date 3/11 3/15 3/18 3/22 3/25 3/29         NO Auth required - KX mod Used 13 14 15 16 17 18         Visits till re-eval Remaining  11 10 9 8 7 6                Precautions: N/a      Manuals 3/11 3/15 3/18 3/22 3/25 3/29    3/4   AP TC joint mob JF JF           STM gastroc-soleus JF MM VK JF MM MM + ant tib    NB   MWM AP TC joint standing JF            Lymph Massage          NB   Neuro Re-Ed 3/11 3/15 3/18 3/22 3/25 3/29    3/4   Heel lifts             TB 4 way      DF rtb x30       SLR supine and SL and prone             SL heel raises on leg press with block 3x10 35# 3x10 35# 3x10 45# 3x10 45# 3x10 45# 3x10       Leg Press 2 up 1 down      nv       Seated soleus heel raise      15# DB 3x10       Vigor gym             Tandem stance 2x30\" 2x30\" 2x30\"ea B/L 3x30\" b/l 3x30\" b/l Foam 3x30\"    3x30\" b/l   Tandem walk 10'x4 laps 10'x4 laps 4x20' across floor 4x20' across floor 4x20' across floor 4x20' across floor    10'x4 laps   Ball toss on foam GMB foam mod tandem 30x ea GMB " "foam mod tandem 30x ea Gmb 2x30 ea B/L Rmb 2x30 ea B/L Rmb 2x30 ea B/L        SLS   2x30\" ea B/L          Ecc lowering HR     2 up 1 dwn 20x 2 up 1 dwn 20x       Ther Ex   3/18  3/25 3/29       Recumbent bike 10' 10' 10' NS 10' bike 10' 10'    10'   Standing lunge on step for gastroc/soleus mob 10x10\" b/l 10x10\" b/l 10x10\" ea B/L 10x10\" b/l 10x10\" b/l 10x10\" b/l                                                                                     Ther Activity                                       Gait Training             Rock back and forth flat ground                          Modalities                                                    "

## 2024-04-01 ENCOUNTER — OFFICE VISIT (OUTPATIENT)
Dept: PHYSICAL THERAPY | Age: 69
End: 2024-04-01
Payer: MEDICARE

## 2024-04-01 DIAGNOSIS — M79.662 PAIN OF LEFT LOWER LEG: ICD-10-CM

## 2024-04-01 DIAGNOSIS — S86.892D LEFT MEDIAL TIBIAL STRESS SYNDROME, SUBSEQUENT ENCOUNTER: Primary | ICD-10-CM

## 2024-04-01 PROCEDURE — 97110 THERAPEUTIC EXERCISES: CPT

## 2024-04-01 PROCEDURE — 97140 MANUAL THERAPY 1/> REGIONS: CPT

## 2024-04-01 PROCEDURE — 97112 NEUROMUSCULAR REEDUCATION: CPT

## 2024-04-01 NOTE — PROGRESS NOTES
"Daily Note     Today's date: 2024  Patient name: Elizabeth Graham  : 1955  MRN: [id number]  Referring provider: Elliott Russell MD  Dx:   Encounter Diagnosis     ICD-10-CM    1. Left medial tibial stress syndrome, subsequent encounter  S86.892D       2. Pain of left lower leg  M79.662                      Subjective: Pt reports that she is doing really well today with no complaints of pain or soreness. Notes that she is able to pedal on the bike with no pain.       Objective: See treatment diary below      Assessment: Tolerated treatment well. Pt was able to begin on the bike to start session. Added in continued LE strengthening focusing on SL eccentrics.  Increased tenderness present over anterior tib with manuals. Patient demonstrated fatigue post treatment and would benefit from continued PT      Plan: Continue per plan of care.      Visit/Unit Tracking  AUTH Status:  Date 3/11 3/15 3/18 3/22 3/25 3/29 4/1        NO Auth required - KX mod Used 13 14 15 16 17 18 19 foto        Visits till re-eval Remaining  11 10 9 8 7 6 5               Precautions: N/a      Manuals 3/11 3/15 3/18 3/22 3/25 3/29 4/1   3   AP TC joint mob JF JF           STM gastroc-soleus JF MM VK JF MM MM + ant tib MM + ant tib   NB   MWM AP TC joint standing JF            Lymph Massage          NB   Neuro Re-Ed 3/11 3/15 3/18 3/22 3/25 3/29 4/1   3   Heel lifts             TB 4 way      DF rtb x30 DF gtb x30      SLR supine and SL and prone             SL heel raises on leg press with block 3x10 35# 3x10 35# 3x10 45# 3x10 45# 3x10 45# 3x10 55# 3x10      Leg Press 2 up 1 down      nv 85# 2x10      Seated soleus heel raise      15# DB 3x10 15# DB 3x10      Vigor gym             Tandem stance 2x30\" 2x30\" 2x30\"ea B/L 3x30\" b/l 3x30\" b/l Foam 3x30\" Foam 3x30\"   3x30\" b/l   Tandem walk 10'x4 laps 10'x4 laps 4x20' across floor 4x20' across floor 4x20' across floor 4x20' across floor 4x20' +backwards   10'x4 laps   Ball toss on foam GMB " "foam mod tandem 30x ea GMB foam mod tandem 30x ea Gmb 2x30 ea B/L Rmb 2x30 ea B/L Rmb 2x30 ea B/L        SLS   2x30\" ea B/L          Ecc lowering HR     2 up 1 dwn 20x 2 up 1 dwn 20x 2 up 1 dwn 20x      Ther Ex   3/18  3/25 3/29 4/1      Recumbent bike 10' 10' 10' NS 10' bike 10' 10' 10'   10'   Standing lunge on step for gastroc/soleus mob 10x10\" b/l 10x10\" b/l 10x10\" ea B/L 10x10\" b/l 10x10\" b/l 10x10\" b/l                                                                                     Ther Activity                                       Gait Training             Rock back and forth flat ground                          Modalities                                                    "

## 2024-04-05 ENCOUNTER — OFFICE VISIT (OUTPATIENT)
Dept: PHYSICAL THERAPY | Age: 69
End: 2024-04-05
Payer: MEDICARE

## 2024-04-05 DIAGNOSIS — S86.892D LEFT MEDIAL TIBIAL STRESS SYNDROME, SUBSEQUENT ENCOUNTER: Primary | ICD-10-CM

## 2024-04-05 DIAGNOSIS — M79.662 PAIN OF LEFT LOWER LEG: ICD-10-CM

## 2024-04-05 PROCEDURE — 97110 THERAPEUTIC EXERCISES: CPT

## 2024-04-05 PROCEDURE — 97140 MANUAL THERAPY 1/> REGIONS: CPT

## 2024-04-05 PROCEDURE — 97112 NEUROMUSCULAR REEDUCATION: CPT

## 2024-04-05 NOTE — PROGRESS NOTES
"Daily Note     Today's date: 2024  Patient name: Elizabeth Graham  : 1955  MRN: [id number]  Referring provider: Elliott Russell MD  Dx:   Encounter Diagnosis     ICD-10-CM    1. Left medial tibial stress syndrome, subsequent encounter  S86.892D       2. Pain of left lower leg  M79.662                      Subjective: Pt reports that she was having some pain in her L shin but today is feeling good, denying any pain. Notes that she has been doing her HEP.       Objective: See treatment diary below      Assessment: Tolerated treatment well. Pt was able to begin on the bike to start session as an active warmup. She was lacking DF ROM compared to her R ankle but did improve as session went on. Added in resisted walking and SLS on the foam today to promote strengthening and balance.  Patient demonstrated fatigue post treatment and would benefit from continued PT      Plan: Continue per plan of care.      Visit/Unit Tracking  AUTH Status:  Date 3/11 3/15 3/18 3/22 3/25 3/29 4/1 4       NO Auth required - KX mod Used 13 14 15 16 17 18 19 foto 20       Visits till re-eval Remaining  11 10 9 8 7 6 5 4              Precautions: N/a      Manuals 3/11 3/15 3/18 3/22 3/25 3/29 4/1 4/5     AP TC joint mob JF JF           STM gastroc-soleus JF MM VK JF MM MM + ant tib MM + ant tib MM + ant tib     MWM AP TC joint standing JF            Lymph Massage             Neuro Re-Ed 3/11 3/15 3/18 3/22 3/25 3/29 4/1 4/5     Heel lifts             TB 4 way      DF rtb x30 DF gtb x30 DF GTB 3x10     SLR supine and SL and prone             SL heel raises on leg press with block 3x10 35# 3x10 35# 3x10 45# 3x10 45# 3x10 45# 3x10 55# 3x10 55# 3x10     Leg Press 2 up 1 down      nv 85# 2x10 85# 3x10     Seated soleus heel raise      15# DB 3x10 15# DB 3x10 20# DB 3x10     Vigor gym             Tandem stance 2x30\" 2x30\" 2x30\"ea B/L 3x30\" b/l 3x30\" b/l Foam 3x30\" Foam 3x30\"      Tandem walk 10'x4 laps 10'x4 laps 4x20' across floor " "4x20' across floor 4x20' across floor 4x20' across floor 4x20' +backwards      Ball toss on foam GMB foam mod tandem 30x ea GMB foam mod tandem 30x ea Gmb 2x30 ea B/L Rmb 2x30 ea B/L Rmb 2x30 ea B/L        SLS   2x30\" ea B/L     3x30\" foam     Ecc lowering HR     2 up 1 dwn 20x 2 up 1 dwn 20x 2 up 1 dwn 20x 2 up 1 dwn 20x     [place] Bw walking        20# 10x     BOSU squats        2x10     Ther Ex   3/18  3/25 3/29 4/1 4/5     Recumbent bike 10' 10' 10' NS 10' bike 10' 10' 10' 10'     Standing lunge on step for gastroc/soleus mob 10x10\" b/l 10x10\" b/l 10x10\" ea B/L 10x10\" b/l 10x10\" b/l 10x10\" b/l                                                                                     Ther Activity                                       Gait Training             Rock back and forth flat ground                          Modalities                                                    "

## 2024-04-08 ENCOUNTER — OFFICE VISIT (OUTPATIENT)
Dept: PHYSICAL THERAPY | Age: 69
End: 2024-04-08
Payer: MEDICARE

## 2024-04-08 DIAGNOSIS — M79.662 PAIN OF LEFT LOWER LEG: ICD-10-CM

## 2024-04-08 DIAGNOSIS — S86.892D LEFT MEDIAL TIBIAL STRESS SYNDROME, SUBSEQUENT ENCOUNTER: Primary | ICD-10-CM

## 2024-04-08 PROCEDURE — 97112 NEUROMUSCULAR REEDUCATION: CPT

## 2024-04-08 PROCEDURE — 97110 THERAPEUTIC EXERCISES: CPT

## 2024-04-08 PROCEDURE — 97140 MANUAL THERAPY 1/> REGIONS: CPT

## 2024-04-08 NOTE — PROGRESS NOTES
"Daily Note     Today's date: 2024  Patient name: Elizabeth Graham  : 1955  MRN: [id number]  Referring provider: Elliott Russell MD  Dx:   Encounter Diagnosis     ICD-10-CM    1. Left medial tibial stress syndrome, subsequent encounter  S86.892D       2. Pain of left lower leg  M79.662                      Subjective: Pt reports that her shin was sore for most of the weekend but overall is doing well. Notes that she is feeling better today as far as her soreness.     Objective: See treatment diary below      Assessment: Tolerated treatment well. Tenderness still present along her anterior tib with griddiness noted during STM. Continued to focus on LE strengthening and stability exercises being appropriately challenged. Patient demonstrated fatigue post treatment and would benefit from continued PT      Plan: Continue per plan of care.      Visit/Unit Tracking  AUTH Status:  Date 3/11 3/15 3/18 3/22 3/25 3/29 4/1 4/5 4/8      NO Auth required - KX mod Used 13 14 15 16 17 18 19 foto 20 21      Visits till re-eval Remaining  11 10 9 8 7 6 5 4 3             Precautions: N/a      Manuals 3/11 3/15 3/18 3/22 3/25 3/29 4/1 4/5 4/8    AP TC joint mob JF JF           STM gastroc-soleus JF MM VK JF MM MM + ant tib MM + ant tib MM + ant tib MM + ant tib    MWM AP TC joint standing JF            Lymph Massage             Neuro Re-Ed 3/11 3/15 3/18 3/22 3/25 3/29 4/1 4/5 4    Heel lifts             TB 4 way      DF rtb x30 DF gtb x30 DF GTB 3x10 DF GTB 3x10    SLR supine and SL and prone             SL heel raises on leg press with block 3x10 35# 3x10 35# 3x10 45# 3x10 45# 3x10 45# 3x10 55# 3x10 55# 3x10 55# 3x10    Leg Press 2 up 1 down      nv 85# 2x10 85# 3x10 85# 3x10    Seated soleus heel raise      15# DB 3x10 15# DB 3x10 20# DB 3x10 20# DB 3x10    Vigor gym             Tandem stance 2x30\" 2x30\" 2x30\"ea B/L 3x30\" b/l 3x30\" b/l Foam 3x30\" Foam 3x30\"      Tandem walk 10'x4 laps 10'x4 laps 4x20' across floor " "4x20' across floor 4x20' across floor 4x20' across floor 4x20' +backwards      Ball toss on foam GMB foam mod tandem 30x ea GMB foam mod tandem 30x ea Gmb 2x30 ea B/L Rmb 2x30 ea B/L Rmb 2x30 ea B/L        SLS   2x30\" ea B/L     3x30\" foam 3x30\" foam    Ecc lowering HR     2 up 1 dwn 20x 2 up 1 dwn 20x 2 up 1 dwn 20x 2 up 1 dwn 20x 2 up 1 dwn 20x    Bernard Bw walking        20# 10x 20# 10x    BOSU squats        2x10 2x10    Ther Ex   3/18  3/25 3/29 4/1 4/5 4/8    Recumbent bike 10' 10' 10' NS 10' bike 10' 10' 10' 10' 10'    Standing lunge on step for gastroc/soleus mob 10x10\" b/l 10x10\" b/l 10x10\" ea B/L 10x10\" b/l 10x10\" b/l 10x10\" b/l                                                                                     Ther Activity                                       Gait Training             Rock back and forth flat ground                          Modalities                                                    "

## 2024-04-12 ENCOUNTER — OFFICE VISIT (OUTPATIENT)
Dept: PHYSICAL THERAPY | Age: 69
End: 2024-04-12
Payer: MEDICARE

## 2024-04-12 DIAGNOSIS — M79.662 PAIN OF LEFT LOWER LEG: ICD-10-CM

## 2024-04-12 DIAGNOSIS — S86.892D LEFT MEDIAL TIBIAL STRESS SYNDROME, SUBSEQUENT ENCOUNTER: Primary | ICD-10-CM

## 2024-04-12 PROCEDURE — 97110 THERAPEUTIC EXERCISES: CPT | Performed by: PHYSICAL THERAPIST

## 2024-04-12 PROCEDURE — 97140 MANUAL THERAPY 1/> REGIONS: CPT | Performed by: PHYSICAL THERAPIST

## 2024-04-12 PROCEDURE — 97112 NEUROMUSCULAR REEDUCATION: CPT | Performed by: PHYSICAL THERAPIST

## 2024-04-12 NOTE — PROGRESS NOTES
"Daily Note     Today's date: 2024  Patient name: Elizabeth Graham  : 1955  MRN: [id number]  Referring provider: Elliott Russell MD  Dx:   Encounter Diagnosis     ICD-10-CM    1. Left medial tibial stress syndrome, subsequent encounter  S86.892D       2. Pain of left lower leg  M79.662           Start Time: 0930  Stop Time: 1025  Total time in clinic (min): 55 minutes    Subjective: Pt reports improvements in symptoms.       Objective: See treatment diary below      Assessment: Tolerated treatment well. Pt's POC was progressed to include more standing functional interventions. Patient demonstrated fatigue post treatment and would benefit from continued PT      Plan: Continue per plan of care.      Visit/Unit Tracking  AUTH Status:  Date 3/11 3/15 3/18 3/22 3/25 3/29 4/1 4/5 4/8 4/12     NO Auth required - KX mod Used 13 14 15 16 17 18 19 foto 20 21 22     Visits till re-eval Remaining  11 10 9 8 7 6 5 4 3 2            Precautions: N/a      Manuals 3/11 3/15 3/18 3/22 3/25 3/29 4/1 4/5 4/8 4/12   AP TC joint mob JF JF           STM gastroc-soleus JF MM VK JF MM MM + ant tib MM + ant tib MM + ant tib MM + ant tib JF   MWM AP TC joint standing JF         JF   Lymph Massage             Neuro Re-Ed 3/11 3/15 3/18 3/22 3/25 3/29 4/1 4/5 4/8 4/12   Heel lifts             TB 4 way      DF rtb x30 DF gtb x30 DF GTB 3x10 DF GTB 3x10    Lateral walks & Monster walks          3 laps gtb lateral, add monster nv   SL heel raises on leg press with block 3x10 35# 3x10 35# 3x10 45# 3x10 45# 3x10 45# 3x10 55# 3x10 55# 3x10 55# 3x10    Leg Press 2 up 1 down      nv 85# 2x10 85# 3x10 85# 3x10    Seated soleus heel raise      15# DB 3x10 15# DB 3x10 20# DB 3x10 20# DB 3x10 nv   Vigor gym             Tandem stance 2x30\" 2x30\" 2x30\"ea B/L 3x30\" b/l 3x30\" b/l Foam 3x30\" Foam 3x30\"   Foam 3x40\"   Tandem walk 10'x4 laps 10'x4 laps 4x20' across floor 4x20' across floor 4x20' across floor 4x20' across floor 4x20' +backwards    " "  Ball toss on foam GMB foam mod tandem 30x ea GMB foam mod tandem 30x ea Gmb 2x30 ea B/L Rmb 2x30 ea B/L Rmb 2x30 ea B/L        SLS   2x30\" ea B/L     3x30\" foam 3x30\" foam 3x30 \"foam   Ecc lowering HR     2 up 1 dwn 20x 2 up 1 dwn 20x 2 up 1 dwn 20x 2 up 1 dwn 20x 2 up 1 dwn 20x 2 up 1 down 30x   Bernard Bw walking        20# 10x 20# 10x 20# 20x   BOSU squats        2x10 2x10    Ther Ex   3/18  3/25 3/29 4/1 4/5 4/8    Recumbent bike 10' 10' 10' NS 10' bike 10' 10' 10' 10' 10' 10'   Standing lunge on step for gastroc/soleus mob 10x10\" b/l 10x10\" b/l 10x10\" ea B/L 10x10\" b/l 10x10\" b/l 10x10\" b/l    10x10\"                                                                                 Ther Activity                                       Gait Training             Rock back and forth flat ground                          Modalities                                                      "

## 2024-04-15 ENCOUNTER — OFFICE VISIT (OUTPATIENT)
Dept: PHYSICAL THERAPY | Age: 69
End: 2024-04-15
Payer: MEDICARE

## 2024-04-15 DIAGNOSIS — S86.892D LEFT MEDIAL TIBIAL STRESS SYNDROME, SUBSEQUENT ENCOUNTER: Primary | ICD-10-CM

## 2024-04-15 DIAGNOSIS — M79.662 PAIN OF LEFT LOWER LEG: ICD-10-CM

## 2024-04-15 PROCEDURE — 97112 NEUROMUSCULAR REEDUCATION: CPT | Performed by: PHYSICAL THERAPIST

## 2024-04-15 PROCEDURE — 97140 MANUAL THERAPY 1/> REGIONS: CPT | Performed by: PHYSICAL THERAPIST

## 2024-04-15 PROCEDURE — 97110 THERAPEUTIC EXERCISES: CPT | Performed by: PHYSICAL THERAPIST

## 2024-04-15 NOTE — PROGRESS NOTES
PT Re-evaluation     Today's date: 4/15/2024  Patient name: Elizabeth Graham  : 1955  MRN: [id number]  Referring provider: Elliott Russell MD  Dx:   Encounter Diagnosis     ICD-10-CM    1. Left medial tibial stress syndrome, subsequent encounter  S86.892D       2. Pain of left lower leg  M79.662           Start Time: 1415  Stop Time: 1500  Total time in clinic (min): 45 minutes    Assessment  Assessment details: Pt is a 67 y/o female who presents with left leg pain. No further referral is necessary at this time. Pt has a movement impairment diagnosis of decrease ankle mobility similar to a pathoantomical diagnosis of medial tibial stress syndrome. Pt has seen improvements with pain and reports 90% improvements but still has difficulty with walking on uneven terrain. Continue to strengthen and increase ankle mobility.     Impairments: abnormal or restricted ROM, impaired physical strength, lacks appropriate home exercise program, pain with function, poor posture  and poor body mechanics  Understanding of Dx/Px/POC: good   Prognosis: good    Goals  Short Term Goals: to be achieved by 4 weeks - MET  1) Patient to be independent with basic HEP.  2) Decrease pain to 2/10 at its worst.  3) Increase DF ROM by 5-10 degrees   4) Increase LE strength by 1/2 MMT grade in all deficient planes.    Long Term Goals: to be achieved by discharge - In progress  1) FOTO equal to or greater than 65.  2) Ambulation to improve to maximal level of function  3) Stair negotiation will improve to reciprocal. MET  4) Sit to stand transfers will improve to maximal level of function  MET    Plan  Patient would benefit from: skilled physical therapy  Planned modality interventions: cryotherapy and thermotherapy: hydrocollator packs  Planned therapy interventions: neuromuscular re-education, patient education, stretching, strengthening, therapeutic activities, therapeutic exercise, therapeutic training, home exercise program and graded  "activity  Frequency: Twice a week for 6 weeks.  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 Patient started having a lot of pain and swelling and saw numerous doctors. After numerous testing was performed to rule out more sinister pathology it was ruled that symptoms are orthopedic in nature. Follow up with Dr. Russell on 3/8/24. Pt has no relevant PMH but does have a history of calf pain and tightness.   Quality of life: good    Patient Goals  Patient goals for therapy: decreased pain, independence with ADLs/IADLs, return to sport/leisure activities, increased strength and increased motion    Pain  Current pain ratin  At best pain ratin  At worst pain ratin  Quality: sharp, radiating and dull ache  Aggravating factors: stair climbing, standing, running, lifting and walking    Hand dominance: right          Objective     Concurrent Complaints  Negative for night pain, disturbed sleep, bladder dysfunction, bowel dysfunction, saddle (S4) numbness, cardiac problem, kidney problem, gallbladder problem, stomach problem, ulcer, appendix problem, spleen problem, pancreas problem, history of cancer, history of trauma and infection    Additional Special Questions  Lumbar spine screen is unremarkable    Tenderness   Left Ankle/Foot   Tenderness in the posterior tibial tendon. No tenderness in the Achilles insertion, anterior talofibular ligament and proximal Achilles.     Passive Range of Motion   Left Ankle/Foot    Dorsiflexion (ke): 15 degrees     Right Ankle/Foot    Dorsiflexion (ke): 20 degrees     Strength/Myotome Testing     Left Hip   Planes of Motion   Flexion: 4+  Abduction: 4+  External rotation: 4+    Left Knee   Flexion: 5  Extension: 5    Left Ankle/Foot   Dorsiflexion: 4  Plantar flexion: 4  Inversion: " "4  Eversion: 4    Right Ankle/Foot   Dorsiflexion: 5  Plantar flexion: 5  Inversion: 5  Eversion: 5    Tests   Left Ankle/Foot   Negative for anterior drawer and Kelley.     Additional Tests Details  Improved mobility of ankle    General Comments:      Ankle/Foot Comments   SLS: Mod sway 22 secs LLE vs 30 secs min sway on right        Visit/Unit Tracking  AUTH Status:  Date 3/11 3/15 3/18 3/22 3/25 3/29 4/1 4/5 4/8 4/12 4/15    NO Auth required - KX mod Used 13 14 15 16 17 18 19 foto 20 21 22 23    Visits till re-eval Remaining  11 10 9 8 7 6 5 4 3 2 1           Precautions: N/a      Manuals 3/11 3/15 3/18 3/22 3/25 3/29 4/1 4/5 4/8 4/15   AP TC joint mob JF JF           STM gastroc-soleus JF MM VK JF MM MM + ant tib MM + ant tib MM + ant tib MM + ant tib JF   MWM AP TC joint standing JF         JF   Lymph Massage             Neuro Re-Ed 3/11 3/15 3/18 3/22 3/25 3/29 4/1 4/5 4/8 4/15   Heel lifts             TB 4 way      DF rtb x30 DF gtb x30 DF GTB 3x10 DF GTB 3x10    Lateral walks & Monster walks          3 laps gtb lateral, add monster nv   SL heel raises on leg press with block 3x10 35# 3x10 35# 3x10 45# 3x10 45# 3x10 45# 3x10 55# 3x10 55# 3x10 55# 3x10    Leg Press 2 up 1 down      nv 85# 2x10 85# 3x10 85# 3x10 85# 3x10   Seated soleus heel raise      15# DB 3x10 15# DB 3x10 20# DB 3x10 20# DB 3x10 nv   Vigor gym             Tandem stance 2x30\" 2x30\" 2x30\"ea B/L 3x30\" b/l 3x30\" b/l Foam 3x30\" Foam 3x30\"   Foam 3x40\"   Tandem walk 10'x4 laps 10'x4 laps 4x20' across floor 4x20' across floor 4x20' across floor 4x20' across floor 4x20' +backwards      Ball toss on foam GMB foam mod tandem 30x ea GMB foam mod tandem 30x ea Gmb 2x30 ea B/L Rmb 2x30 ea B/L Rmb 2x30 ea B/L        SLS   2x30\" ea B/L     3x30\" foam 3x30\" foam 3x30 \"foam   Ecc lowering HR     2 up 1 dwn 20x 2 up 1 dwn 20x 2 up 1 dwn 20x 2 up 1 dwn 20x 2 up 1 dwn 20x 2 up 1 down 30x   [place] Bw walking        20# 10x 20# 10x 20# 20x   BOSU squats        " "2x10 2x10 3x10   Ther Ex   3/18  3/25 3/29 4/1 4/5 4/8    Recumbent bike 10' 10' 10' NS 10' bike 10' 10' 10' 10' 10' 10'   Standing lunge on step for gastroc/soleus mob 10x10\" b/l 10x10\" b/l 10x10\" ea B/L 10x10\" b/l 10x10\" b/l 10x10\" b/l    10x10\"                                                                                 Ther Activity                                       Gait Training             Rock back and forth flat ground                          Modalities                                                        "

## 2024-04-19 ENCOUNTER — OFFICE VISIT (OUTPATIENT)
Dept: PHYSICAL THERAPY | Age: 69
End: 2024-04-19
Payer: MEDICARE

## 2024-04-19 ENCOUNTER — TELEPHONE (OUTPATIENT)
Age: 69
End: 2024-04-19

## 2024-04-19 DIAGNOSIS — M79.662 PAIN OF LEFT LOWER LEG: ICD-10-CM

## 2024-04-19 DIAGNOSIS — S86.892D LEFT MEDIAL TIBIAL STRESS SYNDROME, SUBSEQUENT ENCOUNTER: Primary | ICD-10-CM

## 2024-04-19 PROCEDURE — 97112 NEUROMUSCULAR REEDUCATION: CPT | Performed by: PHYSICAL THERAPIST

## 2024-04-19 PROCEDURE — 97110 THERAPEUTIC EXERCISES: CPT | Performed by: PHYSICAL THERAPIST

## 2024-04-19 PROCEDURE — 97140 MANUAL THERAPY 1/> REGIONS: CPT | Performed by: PHYSICAL THERAPIST

## 2024-04-19 NOTE — TELEPHONE ENCOUNTER
Pt called in to reschedule her appt. Pt is scheduled to see Dr. Cox Sept 06, 2024 and on a waitlist. Pt is aware and confirmed

## 2024-04-19 NOTE — PROGRESS NOTES
"Daily Note     Today's date: 2024  Patient name: Elizabeth Graham  : 1955  MRN: [id number]  Referring provider: Elliott Russell MD  Dx:   Encounter Diagnosis     ICD-10-CM    1. Left medial tibial stress syndrome, subsequent encounter  S86.892D       2. Pain of left lower leg  M79.662           Start Time: 1115  Stop Time: 1155  Total time in clinic (min): 40 minutes    Subjective: Pt reports soreness two days this week.       Objective: See treatment diary below      Assessment: Tolerated treatment well. Pt's POC was progressed to include PT ed on proper footware. Patient's POC was progressed to include intrinsic foot strengthening. Patient demonstrated fatigue post treatment and would benefit from continued PT      Plan: Continue per plan of care.      Visit/Unit Tracking  AUTH Status:  Date               NO Auth required - KX mod Used 24              Visits till re-eval Remaining  8 (re-eval 4/15)                     Precautions: N/a      Manuals             AP TC joint mob JF            STM gastroc-soleus JF                                      Neuro Re-Ed             Heel lifts eccentric lowering  3x10            TB 4 way             Lateral walks & Monster walks nv            SLS foam 3x30\"            Leg Press  3x10 115#            Seated towel crunch toes 3x15            Vigor gym             Tandem stance             Tandem walk             Ball toss on foam             SLS             Ecc lowering HR             Bernard Bw walking             BOSU squats 3x10            Ther Ex             Recumbent bike             Standing lunge on step for gastroc/soleus mob 15x10\"                                                                                          Ther Activity                                       Gait Training             Rock back and forth flat ground                          Modalities                                                          "

## 2024-04-22 ENCOUNTER — OFFICE VISIT (OUTPATIENT)
Dept: PHYSICAL THERAPY | Age: 69
End: 2024-04-22
Payer: MEDICARE

## 2024-04-22 DIAGNOSIS — M79.662 PAIN OF LEFT LOWER LEG: ICD-10-CM

## 2024-04-22 DIAGNOSIS — S86.892D LEFT MEDIAL TIBIAL STRESS SYNDROME, SUBSEQUENT ENCOUNTER: Primary | ICD-10-CM

## 2024-04-22 PROCEDURE — 97110 THERAPEUTIC EXERCISES: CPT

## 2024-04-22 PROCEDURE — 97112 NEUROMUSCULAR REEDUCATION: CPT

## 2024-04-22 PROCEDURE — 97140 MANUAL THERAPY 1/> REGIONS: CPT

## 2024-04-22 NOTE — PROGRESS NOTES
"Daily Note     Today's date: 2024  Patient name: Elizabeth Graham  : 1955  MRN: [id number]  Referring provider: Elliott Russell MD  Dx:   Encounter Diagnosis     ICD-10-CM    1. Left medial tibial stress syndrome, subsequent encounter  S86.892D       2. Pain of left lower leg  M79.662                      Subjective: Pt reports that she is doing well today with no complaints of pain or soreness. Notes that she had some pain in her L shin but very diminished.       Objective: See treatment diary below      Assessment: Tolerated treatment well. Re educated pt on the importance of proper foot wear again. Minimal tenderness noted over the medial belly of her gastroc with STM. Patient demonstrated fatigue post treatment and would benefit from continued PT      Plan: Continue per plan of care.      Visit/Unit Tracking  AUTH Status:  Date              NO Auth required - KX mod Used 24 25             Visits till re-eval Remaining  8 (re-eval 4/15) 7                    Precautions: N/a      Manuals            AP TC joint mob JF            STM gastroc-soleus JF MM                                     Neuro Re-Ed             Heel lifts eccentric lowering  3x10 3x10           TB 4 way             Lateral walks & Monster walks nv BTB 4 laps ea           SLS foam 3x30\" 3x30\"           Leg Press  3x10 115# 3x10 115#           Seated towel crunch toes 3x15 3x15           Vigor gym             Tandem stance             Tandem walk             Ball toss on foam             SLS             Ecc lowering HR             Bernard Bw walking             BOSU squats 3x10 3x10           Ther Ex             Recumbent bike  10'           Standing lunge on step for gastroc/soleus mob 15x10\" 15x10\"                                                                                         Ther Activity                                       Gait Training             Rock back and forth flat ground                        "   Modalities

## 2024-04-26 ENCOUNTER — OFFICE VISIT (OUTPATIENT)
Dept: PHYSICAL THERAPY | Age: 69
End: 2024-04-26
Payer: MEDICARE

## 2024-04-26 DIAGNOSIS — M79.662 PAIN OF LEFT LOWER LEG: ICD-10-CM

## 2024-04-26 DIAGNOSIS — S86.892D LEFT MEDIAL TIBIAL STRESS SYNDROME, SUBSEQUENT ENCOUNTER: Primary | ICD-10-CM

## 2024-04-26 PROCEDURE — 97140 MANUAL THERAPY 1/> REGIONS: CPT | Performed by: PHYSICAL THERAPIST

## 2024-04-26 PROCEDURE — 97112 NEUROMUSCULAR REEDUCATION: CPT | Performed by: PHYSICAL THERAPIST

## 2024-04-26 PROCEDURE — 97110 THERAPEUTIC EXERCISES: CPT | Performed by: PHYSICAL THERAPIST

## 2024-04-26 NOTE — PROGRESS NOTES
"Daily Note     Today's date: 2024  Patient name: Elizabeth Graham  : 1955  MRN: [id number]  Referring provider: Elliott Russell MD  Dx:   Encounter Diagnosis     ICD-10-CM    1. Left medial tibial stress syndrome, subsequent encounter  S86.892D       2. Pain of left lower leg  M79.665                        Subjective: Patient reports she is doing well today.   \"I wore sneakers 3 days this week.\"      Objective: See treatment diary below      Assessment: Tolerated treatment well. Patient continues to present with palpable restriction in medial gastrocnemius.  Tenderness decreased with STM.  Patient has no difficulty with exercise today.  Patient demonstrated fatigue post treatment and would benefit from continued PT      Plan: Continue per plan of care.      Visit/Unit Tracking  AUTH Status:  Date             NO Auth required - KX mod Used 24 25 24            Visits till re-eval Remaining  8 (re-eval 4/15) 7 6                   Precautions: N/a      Manuals           AP TC joint mob JF            STM gastroc-soleus JF MM AK                                    Neuro Re-Ed             Heel lifts eccentric lowering  3x10 3x10 3 x 10          TB 4 way             Lateral walks & Monster walks nv BTB 4 laps ea BTB 4 laps ea          SLS foam 3x30\" 3x30\" 3x 30\"          Leg Press  3x10 115# 3x10 115# 3 x10 115#          Seated towel crunch toes 3x15 3x15           Vigor gym             Tandem stance             Tandem walk             Ball toss on foam             SLS             Ecc lowering HR             Bernard Bw walking             BOSU squats 3x10 3x10 3 x 10          Ther Ex             Recumbent bike  10' 10'          Standing lunge on step for gastroc/soleus mob 15x10\" 15x10\" 15 x 10\"                                                                                        Ther Activity                                       Gait Training             Rock back and forth flat " ground                          Modalities

## 2024-04-29 ENCOUNTER — OFFICE VISIT (OUTPATIENT)
Dept: PHYSICAL THERAPY | Age: 69
End: 2024-04-29
Payer: MEDICARE

## 2024-04-29 DIAGNOSIS — M79.662 PAIN OF LEFT LOWER LEG: ICD-10-CM

## 2024-04-29 DIAGNOSIS — S86.892D LEFT MEDIAL TIBIAL STRESS SYNDROME, SUBSEQUENT ENCOUNTER: Primary | ICD-10-CM

## 2024-04-29 PROCEDURE — 97112 NEUROMUSCULAR REEDUCATION: CPT

## 2024-04-29 PROCEDURE — 97140 MANUAL THERAPY 1/> REGIONS: CPT

## 2024-04-29 PROCEDURE — 97110 THERAPEUTIC EXERCISES: CPT

## 2024-04-29 NOTE — PROGRESS NOTES
"Daily Note     Today's date: 2024  Patient name: Elizabeth Graham  : 1955  MRN: [id number]  Referring provider: Elliott Russell MD  Dx:   Encounter Diagnosis     ICD-10-CM    1. Left medial tibial stress syndrome, subsequent encounter  S86.892D       2. 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 Notes that she continues to have some discomfort in the front of her L leg in her shin region.       Objective: See treatment diary below      Assessment: Tolerated treatment well. Patient continues to demonstrate tenderness over her medial gastroc. Rolled out her entire LLE today with the theraroller. She felt good towards the end of session. Patient demonstrated fatigue post treatment and would benefit from continued PT      Plan: Continue per plan of care.      Visit/Unit Tracking  AUTH Status:  Date            NO Auth required - KX mod Used            Visits till re-eval Remaining  8 (re-eval 4/15) 7 6 5 foto                  Precautions: N/a      Manuals          AP TC joint mob JF            STM gastroc-soleus JF MM AK MM                                   Neuro Re-Ed             Heel lifts eccentric lowering  3x10 3x10 3 x 10 3x10         TB 4 way             Lateral walks & Monster walks nv BTB 4 laps ea BTB 4 laps ea BTB 4 laps ea         SLS foam 3x30\" 3x30\" 3x 30\" 3x30\"         Leg Press  3x10 115# 3x10 115# 3 x10 115# 3x10 115#         Seated towel crunch toes 3x15 3x15           Vigor gym             Tandem stance             Tandem walk             Ball toss on foam             SLS             Ecc lowering HR             [place] Bw walking             BOSU squats 3x10 3x10 3 x 10 3x10         Ther Ex             Recumbent bike  10' 10' 10'         Standing lunge on step for gastroc/soleus mob 15x10\" 15x10\" 15 x 10\" 15x10\"                                              "                                          Ther Activity                                       Gait Training             Rock back and forth flat ground                          Modalities

## 2024-05-06 ENCOUNTER — HOSPITAL ENCOUNTER (OUTPATIENT)
Dept: RADIOLOGY | Facility: MEDICAL CENTER | Age: 69
Discharge: HOME | End: 2024-05-06
Payer: MEDICARE

## 2024-05-06 DIAGNOSIS — E04.1 THYROID NODULE: ICD-10-CM

## 2024-05-06 DIAGNOSIS — Z86.39 H/O HYPERTHYROIDISM: ICD-10-CM

## 2024-05-06 PROCEDURE — 76536 US EXAM OF HEAD AND NECK: CPT

## 2024-05-06 NOTE — LETTER
[facility]  [address] [place] [address]      May 17, 2024    MRN: [id number]     Phone: [phone]     Dear Ms. Graham,    You recently had a(n) Ultrasound performed on 5/6/2024 at  [facility] that was requested by Blayne Cox MD. The study was reviewed by a radiologist, which is a physician who specializes in medical imaging. The radiologist issued a report describing his or her findings. In that report there was a finding that the radiologist felt warranted further discussion with your health care provider and that discussion would be beneficial to you.      The results were sent to Blayne Cox MD on 05/14/2024  5:56 PM. We recommend that you contact Blayne Cox MD at [phone] or set up an appointment to discuss the results of the imaging test. If you have already heard from Blayne Cox MD regarding the results of your study, you can disregard this letter.     This letter is not meant to alarm you, but intended to encourage you to follow-up on your results with the provider that sent you for the imaging study. In addition, we have enclosed answers to frequently asked questions by other patients who have also received a letter to review results with their health care provider (see page two).      Thank you for choosing [facility] for your medical imaging needs.                                                                                                                                                        FREQUENTLY ASKED QUESTIONS    Why am I receiving this letter?  [place] State Law requires us to notify patients who have findings on imaging exams that may require more testing or follow-up with a health professional within the next 3 months.        How serious is the finding on the imaging test?  This letter is sent to all patients who may need follow-up or more testing within the  next 3 months.  Receiving this letter does not necessarily mean you have a life-threatening imaging finding or disease.  Recommendations in the radiologist’s imaging report are general in nature and it is up to your healthcare provider to say whether those recommendations make sense for your situation.  You are strongly encouraged to talk to your health care provider about the results and ask whether additional steps need to be taken.    Where can I get a copy of the final report for my recent radiology exam?  To get a full copy of the report you can access your records online at [email or web address] or please contact [facility]’s Medical Records Department at [phone] Monday through Friday between 8 am and 6 pm.         What do I need to do now?           Please contact your health care provider who requested the imaging study to discuss what further actions (if any) are needed.  You may have already heard from (your ordering provider) in regard to this test in which case you can disregard this letter.        NOTICE IN ACCORDANCE WITH THE [place] STATE “PATIENT TEST RESULT INFORMATION ACT OF 2018”    You are receiving this notice as a result of a determination by your diagnostic imaging service that further discussions of your test results are warranted and would be beneficial to you.    The complete results of your test or tests have been or will be sent to the health care practitioner that ordered the test or tests. It is recommended that you contact your health care practitioner to discuss your results as soon as possible.

## 2024-05-15 ENCOUNTER — TELEPHONE (OUTPATIENT)
Dept: ENDOCRINOLOGY | Facility: CLINIC | Age: 69
End: 2024-05-15

## 2024-05-15 DIAGNOSIS — E04.1 THYROID NODULE: Primary | ICD-10-CM

## 2024-05-15 NOTE — TELEPHONE ENCOUNTER
Called patient to discussed thyroid ultrasound report, left message on voicemail that she should give us a call if she has any question, also send MyChart message to patient.    Blayne Cox

## 2024-05-15 NOTE — TELEPHONE ENCOUNTER
Thyroid nodule , ultrasound-guided biopsy ordered with molecular testing.  Reimage message was sent to patient, also left voicemail on phone    Blayne Cox

## 2024-05-15 NOTE — TELEPHONE ENCOUNTER
Patient returning Dr. Cox's phone call.  Patient is available all day for a return call.    [phone]

## 2024-05-15 NOTE — RESULT ENCOUNTER NOTE
Hi, your thyroid ultrasound report is reviewed.  Right mid gland nodule measuring 2.2 x 1.9 x 1.9 cm has increased in size since 2019, fine-needle aspiration is needed on this nodule. ( Biopsy)  I will order the ultrasound-guided biopsy for this nodule.  Please call central scheduling to make appointment.  Please call our office if you have any further questions

## 2024-05-15 NOTE — TELEPHONE ENCOUNTER
Thyroid blood work ordered.  Spoke with patient, discussed about the thyroid nodule has increased in size.  Also discussed that she will need a fine-needle aspiration cytology for this thyroid nodule.  Order is in place.  Discussed to call central scheduling to schedule for fine-needle aspiration

## 2024-05-17 NOTE — NURSING NOTE
Call placed to patient to discuss upcoming appointment at [facility] radiology department and complete consultation with patient. Patient is having a thyroid biopsy  utilizing US  guidance. Reviewed  patient's allergies, no current anticoagulant medication present per patient, patient has had procedure in the past, no questions when asked if any questions or concerns. Patient made aware of need for  post procedure if anti anxiety medication is taken. . Reminded patient of location and time expected for procedure, Patient expressed understanding by verbalizing and repeating instructions.

## 2024-05-21 ENCOUNTER — TELEPHONE (OUTPATIENT)
Dept: OBGYN CLINIC | Facility: CLINIC | Age: 69
End: 2024-05-21

## 2024-05-25 LAB
T4 FREE SERPL-MCNC: 1.2 NG/DL (ref 0.8–1.8)
TSH SERPL-ACNC: 1.57 MIU/L (ref 0.4–4.5)

## 2024-05-29 ENCOUNTER — HOSPITAL ENCOUNTER (OUTPATIENT)
Dept: RADIOLOGY | Facility: HOSPITAL | Age: 69
Discharge: HOME | End: 2024-05-29
Attending: INTERNAL MEDICINE | Admitting: INTERNAL MEDICINE
Payer: MEDICARE

## 2024-05-29 DIAGNOSIS — E04.1 THYROID NODULE: ICD-10-CM

## 2024-05-29 PROCEDURE — 10005 FNA BX W/US GDN 1ST LES: CPT

## 2024-05-29 PROCEDURE — 88173 CYTOPATH EVAL FNA REPORT: CPT | Performed by: PATHOLOGY

## 2024-05-29 PROCEDURE — 88172 CYTP DX EVAL FNA 1ST EA SITE: CPT | Performed by: PATHOLOGY

## 2024-05-29 RX ORDER — LIDOCAINE HYDROCHLORIDE 10 MG/ML
5 INJECTION, SOLUTION EPIDURAL; INFILTRATION; INTRACAUDAL; PERINEURAL ONCE
Status: COMPLETED | OUTPATIENT
Start: 2024-05-29 | End: 2024-05-29

## 2024-05-29 RX ADMIN — LIDOCAINE HYDROCHLORIDE 3 ML: 10 INJECTION, SOLUTION EPIDURAL; INFILTRATION; INTRACAUDAL; PERINEURAL at 10:50

## 2024-05-31 PROCEDURE — 88173 CYTOPATH EVAL FNA REPORT: CPT | Performed by: PATHOLOGY

## 2024-05-31 PROCEDURE — 88172 CYTP DX EVAL FNA 1ST EA SITE: CPT | Performed by: PATHOLOGY

## 2024-05-31 NOTE — RESULT ENCOUNTER NOTE
Hi  Thyroid nodule biopsy result is benign/negative for malignancy which is assuring.  Please follow-up for your appointment as scheduled

## 2024-09-06 ENCOUNTER — OFFICE VISIT (OUTPATIENT)
Dept: ENDOCRINOLOGY | Facility: CLINIC | Age: 69
End: 2024-09-06
Payer: MEDICARE

## 2024-09-06 VITALS
SYSTOLIC BLOOD PRESSURE: 142 MMHG | OXYGEN SATURATION: 97 % | BODY MASS INDEX: 28.89 KG/M2 | DIASTOLIC BLOOD PRESSURE: 90 MMHG | HEART RATE: 74 BPM | WEIGHT: 153 LBS | HEIGHT: 61 IN

## 2024-09-06 DIAGNOSIS — E04.1 THYROID NODULE: Primary | ICD-10-CM

## 2024-09-06 DIAGNOSIS — Z86.39 H/O HYPERTHYROIDISM: ICD-10-CM

## 2024-09-06 PROCEDURE — 99214 OFFICE O/P EST MOD 30 MIN: CPT | Performed by: INTERNAL MEDICINE

## 2024-09-06 NOTE — PROGRESS NOTES
Elizabeth Graham 69 y.o. female MRN: [id number]    Encounter: [id number]      Assessment & Plan     Assessment:  This is a 69 y.o.-year-old female with thyroid nodule     Plan:  Diagnoses and all orders for this visit:    Thyroid nodule  This nodule was biopsied twice and cytology is benign, most recently it was biopsied in May 2024, with benign results.  Will monitor thyroid ultrasound once a year.  Patient denies any obstructive symptoms.  Complains of some discomfort once in a while when she is having allergy symptoms.  Will also monitor thyroid blood work closely as patient has history of Graves' disease.  Thyroid blood work ordered  -     US thyroid; Future  -     T4, free; Future  -     TSH, 3rd generation; Future  -     T4, free  -     TSH, 3rd generation    H/O hyperthyroidism  Obtain thyroid blood work in 3 months and again in 9 months.  Follow-up in 9 months  -     T4, free; Future  -     TSH, 3rd generation; Future  -     T4, free  -     TSH, 3rd generation  -     T4, free; Future  -     TSH, 3rd generation; Future  -     T4, free  -     TSH, 3rd generation         CC:   Graves' disease, thyroid nodule    History of Present Illness     HPI:    Elizabeth Graham is a 69-year-old female with medical history of hyperthyroidism secondary to Graves' disease, currently in remission, thyroid nodule is here for follow-up.  He was diagnosed with Graves' disease 22 years ago was managed medically for few years and then since then she is in remission for last 20 years.  Thyroid blood work is within normal range.  He had a thyroid ultrasound done in May 2024 which showed right mid gland nodule measuring 2.2 x 1.9 x 1.9 cm unchanged from recent ultrasound however there was a more than 50% growth in volume when compared to 2019.  Moderately suspicious.  She underwent fine-needle aspiration cytology for this nodule and result was benign.  Denies family history of thyroid cancer.  She denies difficulty swallowing, change  in voice    Review of Systems   Constitutional:  Negative for activity change, diaphoresis, fatigue, fever and unexpected weight change.   HENT: Negative.     Eyes:  Negative for visual disturbance.   Respiratory:  Negative for cough, chest tightness and shortness of breath.    Cardiovascular:  Negative for chest pain, palpitations and leg swelling.   Gastrointestinal:  Negative for abdominal pain, blood in stool, constipation, diarrhea, nausea and vomiting.   Endocrine: Negative for cold intolerance, heat intolerance, polydipsia, polyphagia and polyuria.   Genitourinary:  Negative for dysuria, enuresis, frequency and urgency.   Musculoskeletal:  Negative for arthralgias and myalgias.   Skin:  Negative for pallor, rash and wound.   Allergic/Immunologic: Negative.    Neurological:  Negative for dizziness, tremors, weakness and numbness.   Hematological: Negative.    Psychiatric/Behavioral: Negative.         Historical Information   Past Medical History:   Diagnosis Date    Asthma     Cancer (HCC)     Graves disease     Hyperthyroidism     Skin cancer     Varicella      Past Surgical History:   Procedure Laterality Date    IR BIOPSY OTHER  11/1/2023    TONSILLECTOMY      US GUIDED THYROID BIOPSY  8/30/2018    US GUIDED THYROID BIOPSY  5/29/2024     Social History   Social History     Substance and Sexual Activity   Alcohol Use Not Currently    Comment: Social     Social History     Substance and Sexual Activity   Drug Use No     Social History     Tobacco Use   Smoking Status Never   Smokeless Tobacco Never     Family History:   Family History   Problem Relation Age of Onset    Cancer Mother         fatal tumor heart/lung    Alzheimer's disease Father     Hypertension Father     Skin cancer Father     Dementia Father     Cancer Father         basil    Rashes / Skin problems Father         psoriasis    Skin cancer Brother     Cancer Brother     Skin cancer Paternal Aunt     Rheum arthritis Maternal Grandmother      "Stroke Maternal Grandmother     Cancer Paternal Grandmother     Breast cancer Neg Hx     Ovarian cancer Neg Hx     Colon cancer Neg Hx        Meds/Allergies   Current Outpatient Medications   Medication Sig Dispense Refill    estradiol (ESTRACE) 0.1 mg/g vaginal cream Insert 2 g into the vagina daily      Estradiol-Estriol-Progesterone (BIEST/PROGESTERONE TD) Apply topically      PROGESTERONE PO Take 100 mg by mouth in the morning      triamterene-hydrochlorothiazide (DYAZIDE) 37.5-25 mg per capsule Take 1 capsule by mouth daily       No current facility-administered medications for this visit.     Allergies   Allergen Reactions    Furosemide Swelling    Levofloxacin Swelling    Penicillins Swelling       Objective   Vitals: Blood pressure 142/90, pulse 74, height 5' 1\" (1.549 m), weight 69.4 kg (153 lb), SpO2 97%.    Physical Exam  Vitals reviewed.   Constitutional:       General: She is not in acute distress.     Appearance: Normal appearance. She is not ill-appearing.   HENT:      Head: Normocephalic and atraumatic.      Nose: Nose normal.   Eyes:      Extraocular Movements: Extraocular movements intact.      Conjunctiva/sclera: Conjunctivae normal.   Pulmonary:      Effort: No respiratory distress.   Musculoskeletal:      Cervical back: Normal range of motion.   Neurological:      General: No focal deficit present.      Mental Status: She is alert and oriented to person, place, and time.   Psychiatric:         Mood and Affect: Mood normal.         Behavior: Behavior normal.         The history was obtained from the review of the chart, patient.    Lab Results:   Lab Results   Component Value Date/Time    Free t4 1.2 05/24/2024 10:35 AM       Imaging Studies:   Results for orders placed during the hospital encounter of 05/06/24    US thyroid    Impression  Previously biopsied right-sided TR-4 thyroid nodule is demonstrated threshold growth since 2019. A repeat biopsy is recommended.      Reference: ACR Thyroid " "Imaging, Reporting and Data System (TI-RADS): White Paper of the ACR TI-RADS Committee. J AM Chely Radiol 2017;14:587-595. Additional recommendations based on American Thyroid Association 2015 guidelines.    The study was marked in EPIC for significant notification.    Workstation performed: [id number]      I have personally reviewed pertinent reports.      Portions of the record may have been created with voice recognition software. Occasional wrong word or \"sound a like\" substitutions may have occurred due to the inherent limitations of voice recognition software. Read the chart carefully and recognize, using context, where substitutions have occurred.    "

## 2024-12-17 ENCOUNTER — RESULTS FOLLOW-UP (OUTPATIENT)
Dept: ENDOCRINOLOGY | Facility: CLINIC | Age: 69
End: 2024-12-17

## 2024-12-17 LAB
T4 FREE SERPL-MCNC: 1.4 NG/DL (ref 0.8–1.8)
TSH SERPL-ACNC: 1.11 MIU/L (ref 0.4–4.5)

## 2024-12-18 ENCOUNTER — HOSPITAL ENCOUNTER (OUTPATIENT)
Dept: RADIOLOGY | Facility: MEDICAL CENTER | Age: 69
Discharge: HOME | End: 2024-12-18
Payer: MEDICARE

## 2024-12-18 DIAGNOSIS — R10.10 UPPER ABDOMINAL PAIN: ICD-10-CM

## 2024-12-18 PROCEDURE — 76705 ECHO EXAM OF ABDOMEN: CPT

## 2024-12-20 ENCOUNTER — HOSPITAL ENCOUNTER (OUTPATIENT)
Dept: RADIOLOGY | Facility: MEDICAL CENTER | Age: 69
Discharge: HOME | End: 2024-12-20
Payer: MEDICARE

## 2024-12-20 DIAGNOSIS — G43.909 MIGRAINE WITHOUT STATUS MIGRAINOSUS, NOT INTRACTABLE, UNSPECIFIED MIGRAINE TYPE: ICD-10-CM

## 2024-12-20 PROCEDURE — 70450 CT HEAD/BRAIN W/O DYE: CPT

## 2025-01-06 ENCOUNTER — HOSPITAL ENCOUNTER (OUTPATIENT)
Dept: RADIOLOGY | Facility: IMAGING CENTER | Age: 70
Discharge: HOME | End: 2025-01-06
Payer: MEDICARE

## 2025-01-06 DIAGNOSIS — G43.909 MIGRAINE, UNSPECIFIED, NOT INTRACTABLE, WITHOUT STATUS MIGRAINOSUS: ICD-10-CM

## 2025-01-06 PROCEDURE — 70551 MRI BRAIN STEM W/O DYE: CPT

## 2025-01-07 ENCOUNTER — TELEPHONE (OUTPATIENT)
Age: 70
End: 2025-01-07

## 2025-01-07 NOTE — TELEPHONE ENCOUNTER
Pt calling in stating that the patient had blood work completed at [facility] for urine and blood work- pt is unsure of what blood work was completed, pt stating it was to check protein levels in bone marrow. Pt stating that this was ordered by PCP doctor. Pt is asking for a referral from [facility] to hematologist. Pt stating that obgyn isnt aware of the findings from the blood work. Pt was notified that  she will need to have the PCP give the referral to hematolgoy as well as fax over results, pt verbalized understanding.

## 2025-01-17 ENCOUNTER — HOSPITAL ENCOUNTER (OUTPATIENT)
Dept: NUCLEAR MEDICINE | Facility: HOSPITAL | Age: 70
Discharge: HOME | End: 2025-01-17
Payer: MEDICARE

## 2025-01-17 DIAGNOSIS — D16.4 BENIGN NEOPLASM OF BONES OF SKULL AND FACE: ICD-10-CM

## 2025-01-17 PROCEDURE — 78306 BONE IMAGING WHOLE BODY: CPT

## 2025-01-17 PROCEDURE — A9503 TC99M MEDRONATE: HCPCS

## 2025-01-20 ENCOUNTER — HOSPITAL ENCOUNTER (OUTPATIENT)
Dept: RADIOLOGY | Facility: MEDICAL CENTER | Age: 70
Discharge: HOME | End: 2025-01-20
Payer: MEDICARE

## 2025-01-20 DIAGNOSIS — Z78.0 ENCOUNTER FOR OSTEOPOROSIS SCREENING IN ASYMPTOMATIC POSTMENOPAUSAL PATIENT: ICD-10-CM

## 2025-01-20 DIAGNOSIS — Z13.820 ENCOUNTER FOR OSTEOPOROSIS SCREENING IN ASYMPTOMATIC POSTMENOPAUSAL PATIENT: ICD-10-CM

## 2025-01-20 PROCEDURE — 77080 DXA BONE DENSITY AXIAL: CPT

## 2025-01-22 ENCOUNTER — RESULTS FOLLOW-UP (OUTPATIENT)
Age: 70
End: 2025-01-22

## 2025-01-22 DIAGNOSIS — R92.343 EXTREMELY DENSE TISSUE OF BOTH BREASTS ON MAMMOGRAPHY: Primary | ICD-10-CM

## 2025-01-22 NOTE — RESULT ENCOUNTER NOTE
Patient's DXA findings are consistent with osteopenia. Patient is encouraged to begin taking calcium supplementation (600 mg in the AM and 600 mg in the PM) and vitamin D (800-1000 IU). Patient should also try to incorporate weight based exercises into their daily life including walking, and weight training. We also suggest avoiding alcohol and caffeine. Patient can call the office there are any questions.

## 2025-02-14 ENCOUNTER — HOSPITAL ENCOUNTER (OUTPATIENT)
Dept: RADIOLOGY | Facility: IMAGING CENTER | Age: 70
End: 2025-02-14
Payer: MEDICARE

## 2025-02-14 VITALS — HEIGHT: 62 IN | BODY MASS INDEX: 27.6 KG/M2 | WEIGHT: 150 LBS

## 2025-02-14 DIAGNOSIS — Z12.31 ENCOUNTER FOR SCREENING MAMMOGRAM FOR BREAST CANCER: ICD-10-CM

## 2025-02-14 PROCEDURE — 77063 BREAST TOMOSYNTHESIS BI: CPT

## 2025-02-14 PROCEDURE — 77067 SCR MAMMO BI INCL CAD: CPT

## 2025-02-18 NOTE — RESULT ENCOUNTER NOTE
Your mammogram is normal, although your breast tissue is extremely dense which may hide small masses/limit the sensitivity of this mammogram.   Based on your breast density, I would recommend follow up with an automated breast ultrasound (ABUS).   You should verify coverage of this test with your insurance company to be familiar with any out of pocket expenses.   I placed the order for your ABUS test in your chart. You can call central scheduling to choose an appt time convenient for you ([phone]).  Continue with monthly breast self exam, annual clinical breast exam and annual mammogram.

## 2025-03-20 PROBLEM — R92.343 EXTREMELY DENSE TISSUE OF BOTH BREASTS ON MAMMOGRAPHY: Status: ACTIVE | Noted: 2025-03-20

## 2025-03-20 NOTE — PROGRESS NOTES
Assessment/Plan:  Calcium 4522-2242 mg (in divided doses-max 600 mg at one time) + 800-1000 IU Vit D daily unless otherwise directed. Avoid falls.   Exercise 150-300 minutes per week minimum including weight bearing exercises. DEXA scan-  Due     Call your insurance company to verify coverage prior to completing any ordered tests.    No further paps after age 65 as long as there has been adequate normal paps completed, no history of SHERIN 2  or a more severe pap diagnosis in the last 20 years.     Annual mammogram ordered and monthly breast self exam recommended .     ABUS is outstanding. Encouraged to schedule.   Colonoscopy-  Referred to Dr Grande.          Kegels 20 times twice daily.  Vaginal moisturizers twice weekly as needed.   Continued follow up with Dr Dinh as planned to discuss breast symptoms.   Return to office in one year or sooner, if needed.        1. Encntr for gyn exam (general) (routine) w/o abn findings  2. Encounter for screening mammogram for malignant neoplasm of breast  -     Mammo screening bilateral w 3d and cad; Future; Expected date: 02/15/2026  3. Extremely dense tissue of both breasts on mammography  4. Screening for colon cancer  -     Ambulatory Referral to Gastroenterology; Future; Expected date: 2025             Subjective:      Patient ID: Elizabeth Graham is a 70 y.o. female.    HPI    Elizabeth Graham is a 70 y.o.  female who is here today for her annual visit.   She is following with Dr Dinh for menopausal symptom management once yearly.     Here today with c/o bilateral nipple burning.This occurred previously. She stopped all hr hormone treatment for one year and her symptoms resolved. She slowly titrated back up to her current dose.   The nipple burning restarted 6 months ago, so she discontinued her progesterone treatment one week ago.  No lessening of symptoms. No skin changes noted.   Menopausal with no vaginal bleeding.   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     Elizabeth Graham is not sexually active with male partner/  of 31 years. This is acceptable to her.  Monogamous and feels safe in this relationship. She uses a compounded estriol/DHEA gel and estradiol cream intravaginally. Denies vaginal pain, bleeding or dryness.    She is not interested in STD screening today.   She denies vaginal discharge, itching or pelvic pain.   She has no urinary concerns, does not have incontinence.  No bowel concerns.      Last pap:admits to consistent normal lifetime paps   8/18/16 normal   12/4/19 normal pap with negative HR HPV   Mammogram: 02/14/2025 normal with extremely dense breast tissue LTC 13.77%  ABUS is outstanding.   Colonoscopy: Not on file; states done at age 60; she fear anesthesia due to her dads alzheimers developing after being under anesthesia.  DEXA scan: 01/20/2025 osteopenia      Family history of cancer:   Cancer-related family history includes Cancer in her brother, father, mother, and paternal grandmother; Skin cancer in her brother, father, and paternal aunt. There is no history of Breast cancer, Ovarian cancer, or Colon cancer.      The following portions of the patient's history were reviewed and updated as appropriate: allergies, current medications, past family history, past medical history, past social history, past surgical history, and problem list.    Review of Systems   Constitutional: Negative.  Negative for activity change, appetite change, chills, diaphoresis, fatigue, fever and unexpected weight change.   HENT:  Negative for congestion, dental problem, sneezing, sore throat and trouble swallowing.    Eyes:  Negative for visual disturbance.   Respiratory:  Negative for chest tightness and shortness of breath.    Cardiovascular:  Negative for chest pain and leg swelling.   Gastrointestinal:  Negative for abdominal pain, constipation, diarrhea, nausea and vomiting.   Genitourinary:  Negative for difficulty  "urinating, dysuria, frequency, hematuria, pelvic pain, urgency, vaginal bleeding, vaginal discharge and vaginal pain.   Musculoskeletal:  Negative for back pain and neck pain.   Skin: Negative.    Allergic/Immunologic: Negative.    Neurological:  Negative for weakness and headaches.   Hematological:  Negative for adenopathy.   Psychiatric/Behavioral: Negative.           Objective:      /82 (BP Location: Left arm, Patient Position: Sitting, Cuff Size: Adult)   Ht 5' 2\" (1.575 m)   Wt 68.9 kg (152 lb)   BMI 27.80 kg/m²          Physical Exam  Vitals and nursing note reviewed.   Constitutional:       Appearance: Normal appearance. She is well-developed.   HENT:      Head: Normocephalic.   Neck:      Thyroid: No thyromegaly.   Cardiovascular:      Rate and Rhythm: Normal rate and regular rhythm.      Heart sounds: Normal heart sounds.   Pulmonary:      Effort: Pulmonary effort is normal.      Breath sounds: Normal breath sounds.   Chest:   Breasts:     Breasts are symmetrical.      Right: Normal. No inverted nipple, mass, nipple discharge, skin change or tenderness.      Left: Normal. No inverted nipple, mass, nipple discharge, skin change or tenderness.   Abdominal:      Palpations: Abdomen is soft.   Genitourinary:     General: Normal vulva.      Exam position: Lithotomy position.      Labia:         Right: No rash, tenderness, lesion or injury.         Left: No rash, tenderness, lesion or injury.       Urethra: No prolapse, urethral pain, urethral swelling or urethral lesion.      Vagina: No signs of injury and foreign body. No vaginal discharge, erythema, tenderness, bleeding, lesions or prolapsed vaginal walls.      Cervix: Normal.      Uterus: Normal.       Adnexa: Right adnexa normal and left adnexa normal.        Right: No mass, tenderness or fullness.          Left: No mass, tenderness or fullness.        Rectum: No external hemorrhoid.      Comments: Vulvovaginal atrophy  Musculoskeletal:         " General: Normal range of motion.      Cervical back: Normal range of motion.   Lymphadenopathy:      Head:      Right side of head: No submental, submandibular, tonsillar or occipital adenopathy.      Left side of head: No submental, submandibular, tonsillar or occipital adenopathy.      Upper Body:      Right upper body: No supraclavicular or axillary adenopathy.      Left upper body: No supraclavicular or axillary adenopathy.      Lower Body: No right inguinal adenopathy. No left inguinal adenopathy.   Skin:     General: Skin is warm and dry.   Neurological:      Mental Status: She is alert and oriented to person, place, and time.   Psychiatric:         Mood and Affect: Mood normal.         Behavior: Behavior normal. Behavior is cooperative.

## 2025-03-21 ENCOUNTER — ANNUAL EXAM (OUTPATIENT)
Dept: OBGYN CLINIC | Facility: MEDICAL CENTER | Age: 70
End: 2025-03-21
Payer: MEDICARE

## 2025-03-21 VITALS
HEIGHT: 62 IN | SYSTOLIC BLOOD PRESSURE: 120 MMHG | DIASTOLIC BLOOD PRESSURE: 82 MMHG | WEIGHT: 152 LBS | BODY MASS INDEX: 27.97 KG/M2

## 2025-03-21 DIAGNOSIS — R92.343 EXTREMELY DENSE TISSUE OF BOTH BREASTS ON MAMMOGRAPHY: ICD-10-CM

## 2025-03-21 DIAGNOSIS — Z01.419 ENCNTR FOR GYN EXAM (GENERAL) (ROUTINE) W/O ABN FINDINGS: Primary | ICD-10-CM

## 2025-03-21 DIAGNOSIS — Z12.11 SCREENING FOR COLON CANCER: ICD-10-CM

## 2025-03-21 DIAGNOSIS — Z12.31 ENCOUNTER FOR SCREENING MAMMOGRAM FOR MALIGNANT NEOPLASM OF BREAST: ICD-10-CM

## 2025-03-21 PROCEDURE — G0101 CA SCREEN;PELVIC/BREAST EXAM: HCPCS | Performed by: NURSE PRACTITIONER

## 2025-03-21 RX ORDER — NICOTINE 14MG/24HR
PATCH, TRANSDERMAL 24 HOURS TRANSDERMAL
COMMUNITY

## 2025-03-21 RX ORDER — CLOBETASOL PROPIONATE 0.05 MG/G
GEL TOPICAL
COMMUNITY
Start: 2025-02-27

## 2025-03-21 RX ORDER — CEPHRADINE 500 MG
CAPSULE ORAL
COMMUNITY

## 2025-03-21 RX ORDER — ALBUTEROL SULFATE 90 UG/1
INHALANT RESPIRATORY (INHALATION)
COMMUNITY

## 2025-03-21 RX ORDER — AMLODIPINE BESYLATE 5 MG/1
5 TABLET ORAL DAILY
COMMUNITY

## 2025-03-21 NOTE — PATIENT INSTRUCTIONS
Calcium 5304-7923 mg (in divided doses-max 600 mg at one time) + 800-1000 IU Vit D daily unless otherwise directed. Avoid falls.   Exercise 150-300 minutes per week minimum including weight bearing exercises. DEXA scan-  Due 2027    Call your insurance company to verify coverage prior to completing any ordered tests.    No further paps after age 65 as long as there has been adequate normal paps completed, no history of SHERIN 2  or a more severe pap diagnosis in the last 20 years.     Annual mammogram ordered and monthly breast self exam recommended .     ABUS is outstanding. Encouraged to schedule.   Colonoscopy-  Referred to Dr Grande.          Gene 20 times twice daily.  Vaginal moisturizers twice weekly as needed.   Continued follow up with Dr Dinh as planned to discuss breast symptoms.   Return to office in one year or sooner, if needed.

## 2025-07-01 LAB
T4 FREE SERPL-MCNC: 1.4 NG/DL (ref 0.8–1.8)
TSH SERPL-ACNC: 1.68 MIU/L (ref 0.4–4.5)